# Patient Record
Sex: FEMALE | Race: WHITE | Employment: FULL TIME | URBAN - METROPOLITAN AREA
[De-identification: names, ages, dates, MRNs, and addresses within clinical notes are randomized per-mention and may not be internally consistent; named-entity substitution may affect disease eponyms.]

---

## 2017-11-20 ENCOUNTER — OFFICE VISIT (OUTPATIENT)
Dept: NEUROLOGY | Age: 42
End: 2017-11-20

## 2017-11-20 VITALS
RESPIRATION RATE: 18 BRPM | OXYGEN SATURATION: 99 % | BODY MASS INDEX: 22.91 KG/M2 | SYSTOLIC BLOOD PRESSURE: 102 MMHG | WEIGHT: 146 LBS | HEIGHT: 67 IN | HEART RATE: 72 BPM | DIASTOLIC BLOOD PRESSURE: 60 MMHG

## 2017-11-20 DIAGNOSIS — M54.2 CERVICALGIA: ICD-10-CM

## 2017-11-20 DIAGNOSIS — G44.219 EPISODIC TENSION-TYPE HEADACHE, NOT INTRACTABLE: Primary | ICD-10-CM

## 2017-11-20 RX ORDER — CLINDAMYCIN PHOSPHATE AND TRETINOIN 10; .25 MG/G; MG/G
GEL TOPICAL
COMMUNITY

## 2017-11-20 RX ORDER — FLUTICASONE PROPIONATE 50 MCG
2 SPRAY, SUSPENSION (ML) NASAL DAILY
COMMUNITY

## 2017-11-20 RX ORDER — MONTELUKAST SODIUM 10 MG/1
10 TABLET ORAL DAILY
COMMUNITY
End: 2018-03-12

## 2017-11-20 NOTE — PROGRESS NOTES
Neurology Consult Note      HISTORY PROVIDED BY: patient    Chief Complaint:   Chief Complaint   Patient presents with    Headache      Subjective:    Beto Morris is a 43 y.o. right handed female who presents in consultation for headaches. Pt has two charts, the other chart is [facility], last notes in that chart are from delivery in . Pt reports HAs for the last few years, she has been attributing these to sinus disease and allergies, but now having HAs when she doesn't have nasal congestion. She was seen by the NP at the [place] station, Hermelindo Brooks who bhavesh labs. Pain typically starts in back, but then feels like a tight band around her head, if she bends over or valsalvas it gets worse and throbs. No N/V, +Photophobia, no phonophobia, no vision changes. May have been having them once a month, lasting 2-3 days. She recently had a HA x 10 days, not responding to Advil, took Excedrin with relief for the rest of the day. It hurt to have towel around her head. States that this was a particularly bad month, only 10 HA free days, often just dull mild HA that she doesn't take medications for those. Mom with h/o HAs in association with TA at 70yo. Drinks 8 glasses of water a day she believes. Drinks 2 cups of caffeine a day. Sleeps well, but often self-sleep deprived. Bed midnight or later, up at 7:30AM.      Notes from Denia Elizalde, INÉS received after visit and reviewed - OV 17 - Initially seen 17 for HAs. Pt requested to see a neurologist.  Labs CBC with diff - wnl, CMP - wnl, TSH 1.86, ESR 9.       Past Medical History:   Diagnosis Date    Acne     Environmental allergies       Past Surgical History:   Procedure Laterality Date    HX  SECTION      ,       Social History     Social History    Marital status:      Spouse name: N/A    Number of children: N/A    Years of education: N/A     Occupational History    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[employer] History Main Topics    Smoking status: Never Smoker    Smokeless tobacco: Never Used    Alcohol use 1.2 - 1.8 oz/week     2 - 3 Glasses of wine per week    Drug use: No    Sexual activity: Not on file     Other Topics Concern    Not on file     Social History Narrative    Lives in [place] [facility] with  and 2 sons, 4yo and 7yo     Family History   Problem Relation Age of Onset    Headache Mother     Heart Disease Maternal Grandmother     Cancer Maternal Grandfather      bladder    Dementia Paternal Grandmother          Objective:   Review of Systems   Constitutional: Positive for malaise/fatigue. HENT: Negative. Eyes: Negative. Respiratory: Positive for cough. Cardiovascular: Negative. Gastrointestinal: Positive for constipation. Genitourinary: Negative. Musculoskeletal: Positive for myalgias. Skin: Negative. Neurological: Positive for headaches. Endo/Heme/Allergies: Negative. Psychiatric/Behavioral: The patient is nervous/anxious. No Known Allergies     Meds:    Current Outpatient Prescriptions:     montelukast (SINGULAIR) 10 mg tablet, Take 10 mg by mouth daily. , Disp: , Rfl:     fluticasone (FLONASE) 50 mcg/actuation nasal spray, 2 Sprays by Both Nostrils route daily. , Disp: , Rfl:     clindamycin-tretinoin (Mary Ax) 1.2-0.025 % topical gel, Apply  to affected area nightly. apply pea-sized amount TOPICALLY to entire face at bedtime; dot onto chin, cheeks, nose, and forehead then gently rub over entire face., Disp: , Rfl:     LEVONORGESTREL (MIRENA IU), by IntraUTERine route., Disp: , Rfl:     Imaging:  MRI Results (most recent):  No results found for this or any previous visit. CT Results (most recent):  No results found for this or any previous visit. Reviewed records in [employer] and Appwiz tab today    Lab Review   No results found for this or any previous visit.      Exam:  Visit Vitals    /60    Pulse 72    Resp 18    Ht 5' 7\" (1.702 m)    Wt 66.2 kg (146 lb)    SpO2 99%    BMI 22.87 kg/m2     General:  Alert, cooperative, no distress. Head:  Normocephalic, without obvious abnormality, atraumatic. Respiratory:  Heart:   Non labored breathing  Regular rate and rhythm, no murmurs   Neck:   2+ carotids, no bruits   Extremities: Warm, no cyanosis or edema. Pulses: 2+ radial pulses. Neurologic:  MS: Alert and oriented x 4, speech intact. Language intact. Attention and fund of knowledge appropriate. Recent and remote memory intact. Cranial Nerves:  II: visual fields Full to confrontation   II: pupils Equal, round, reactive to light   II: optic disc No papilledema   III,VII: ptosis none   III,IV,VI: extraocular muscles  EOMI, no nystagmus or diplopia   V: facial light touch sensation  normal   VII: facial muscle function   symmetric   VIII: hearing intact   IX: soft palate elevation  normal   XI: trapezius strength  5/5   XI: sternocleidomastoid strength 5/5   XII: tongue  Midline     Motor: normal bulk and tone, no tremor              Strength: 5/5 throughout, no PD  Sensory: intact to LT, PP  Coordination: FTN and HTS intact, LUDA intact  Gait: normal gait, able to tandem walk  Reflexes: 2+ symmetric, toes downgoing           Assessment/Plan   Pt is a 43 y.o. right handed female with onset of HAs in the last few years, with pain typically starting in back of head/neck, but then feels like a tight band around her head, +Photophobia, were only once a month, now HA free for only 10 days in last month. Exam is non-focal and unremarkable. Possible migraine headaches, but more suggestive of tension headaches triggered by neck pain and muscle tension. 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 Self-imposed sleep deprivation may also be playing a role. Pt is not interested in trial of a HA prevention med at this time.   Recommend PT for neck pain triggering HAs and suggested she looking into have an ergonomic evaluation of her work space to improve positioning to prevent neck pain/muscle tension. Pt is asked to keep a HA calendar and f/u in clinic in 3 months, instructed to call in the interim if needed. ICD-10-CM ICD-9-CM    1. Episodic tension-type headache, not intractable G44.219 339.11 REFERRAL TO PHYSICAL THERAPY   2. Cervicalgia M54.2 723.1 REFERRAL TO PHYSICAL THERAPY       Signed:   Melvin Briscoe MD  11/20/2017

## 2017-11-20 NOTE — PATIENT INSTRUCTIONS
10 [facility]   Statement to Patients  April 1, 2014      In an effort to ensure the large volume of patient prescription refills is processed in the most efficient and expeditious manner, we are asking our patients to assist us by calling your Pharmacy for all prescription refills, this will include also your  Mail Order Pharmacy. The pharmacy will contact our office electronically to continue the refill process. Please do not wait until the last minute to call your pharmacy. We need at least 48 hours (2days) to fill prescriptions. We also encourage you to call your pharmacy before going to  your prescription to make sure it is ready. With regard to controlled substance prescription refill requests (narcotic refills) that need to be picked up at our office, we ask your cooperation by providing us with at least 72 hours (3days) notice that you will need a refill. We will not refill narcotic prescription refill requests after 4:00pm on any weekday, Monday through Thursday, or after 2:00pm on Fridays, or on the weekends. We encourage everyone to explore another way of getting your prescription refill request processed using Ringio, our patient web portal through our electronic medical record system. Ringio is an efficient and effective way to communicate your medication request directly to the office and  downloadable as an fox on your smart phone . Ringio also features a review functionality that allows you to view your medication list as well as leave messages for your physician. Are you ready to get connected? If so please review the attatched instructions or speak to any of our staff to get you set up right away! Thank you so much for your cooperation. Should you have any questions please contact our Practice Administrator.     The Physicians and Staff,  [facility]           Please bring all medication bottles, including vitamins, supplements and any over-the-counter medications, to your next office visit.

## 2017-11-20 NOTE — MR AVS SNAPSHOT
Visit Information Date & Time Provider Department Dept. Phone Encounter #  
 11/20/2017 11:00 AM Darling Soto MD [facility] at [address] [id number] Follow-up Instructions Return in about 3 months (around 2/20/2018). Upcoming Health Maintenance Date Due DTaP/Tdap/Td series (1 - Tdap) 10/10/1996 PAP AKA CERVICAL CYTOLOGY 10/10/1996 Influenza Age 5 to Adult 8/1/2017 Allergies as of 11/20/2017  Review Complete On: 11/20/2017 By: Darling Soto MD  
 No Known Allergies Current Immunizations  Never Reviewed No immunizations on file. Not reviewed this visit You Were Diagnosed With   
  
 Codes Comments Episodic tension-type headache, not intractable    -  Primary ICD-10-CM: V85.361 ICD-9-CM: 339.11 Cervicalgia     ICD-10-CM: M54.2 ICD-9-CM: 723.1 Vitals BP Pulse Resp Height(growth percentile) Weight(growth percentile) SpO2  
 102/60 72 18 5' 7\" (1.702 m) 146 lb (66.2 kg) 99% BMI Smoking Status 22.87 kg/m2 Never Smoker Vitals History BMI and BSA Data Body Mass Index Body Surface Area  
 22.87 kg/m 2 1.77 m 2 Preferred Pharmacy Pharmacy Name Phone [id number] [address] [phone] Your Updated Medication List  
  
   
This list is accurate as of: 11/20/17 11:40 AM.  Always use your most recent med list.  
  
  
  
  
 FLONASE 50 mcg/actuation nasal spray Generic drug:  fluticasone 2 Sprays by Both Nostrils route daily. MIRENA IU  
by IntraUTERine route. SINGULAIR 10 mg tablet Generic drug:  montelukast  
Take 10 mg by mouth daily. ZIANA 1.2-0.025 % topical gel Generic drug:  clindamycin-tretinoin Apply  to affected area nightly. apply pea-sized amount TOPICALLY to entire face at bedtime; dot onto chin, cheeks, nose, and forehead then gently rub over entire face. We Performed the Following REFERRAL TO PHYSICAL THERAPY [DAO58 Custom] Comments:  
 Please eval and treat for muscle tension, neck pain, triggering headaches. Follow-up Instructions Return in about 3 months (around 2/20/2018). Referral Information Referral ID Referred By Referred To  
  
 [id number] Petra TORIBIO Not Available Visits Status Start Date End Date 1 New Request 11/20/17 11/20/18 If your referral has a status of pending review or denied, additional information will be sent to support the outcome of this decision. Patient Instructions PRESCRIPTION REFILL POLICY [facility] Statement to Patients April 1, 2014 In an effort to ensure the large volume of patient prescription refills is processed in the most efficient and expeditious manner, we are asking our patients to assist us by calling your Pharmacy for all prescription refills, this will include also your  Mail Order Pharmacy. The pharmacy will contact our office electronically to continue the refill process. Please do not wait until the last minute to call your pharmacy. We need at least 48 hours (2days) to fill prescriptions. We also encourage you to call your pharmacy before going to  your prescription to make sure it is ready. With regard to controlled substance prescription refill requests (narcotic refills) that need to be picked up at our office, we ask your cooperation by providing us with at least 72 hours (3days) notice that you will need a refill. We will not refill narcotic prescription refill requests after 4:00pm on any weekday, Monday through Thursday, or after 2:00pm on Fridays, or on the weekends. We encourage everyone to explore another way of getting your prescription refill request processed using [employer], our patient web portal through our electronic medical record system.  [employer] is an efficient and effective way to communicate your medication request directly to the office and  downloadable as an fox on your smart phone . Skypaz also features a review functionality that allows you to view your medication list as well as leave messages for your physician. Are you ready to get connected? If so please review the attatched instructions or speak to any of our staff to get you set up right away! Thank you so much for your cooperation. Should you have any questions please contact our Practice Administrator. The Physicians and Staff,  [facility] Please bring all medication bottles, including vitamins, supplements and any over-the-counter medications, to your next office visit. Introducing [address]! [facility] introduces Events Coret patient portal. Now you can access parts of your medical record, email your doctor's office, and request medication refills online. 1. In your internet browser, go to [email or web address] 2. Click on the First Time User? Click Here link in the Sign In box. You will see the New Member Sign Up page. 3. Enter your Skypaz Access Code exactly as it appears below. You will not need to use this code after youve completed the sign-up process. If you do not sign up before the expiration date, you must request a new code. · Skypaz Access Code: [email or web address] Expires: 2/18/2018 10:35 AM 
 
4. Enter the last four digits of your Social Security Number (xxxx) and Date of Birth (mm/dd/yyyy) as indicated and click Submit. You will be taken to the next sign-up page. 5. Create a Events Coret ID. This will be your Skypaz login ID and cannot be changed, so think of one that is secure and easy to remember. 6. Create a Skypaz password. You can change your password at any time. 7. Enter your Password Reset Question and Answer. This can be used at a later time if you forget your password. 8. Enter your e-mail address. You will receive e-mail notification when new information is available in [address] 9. Click Sign Up. You can now view and download portions of your medical record. 10. Click the Download Summary menu link to download a portable copy of your medical information. If you have questions, please visit the Frequently Asked Questions section of the [employer] website. Remember, [employer] is NOT to be used for urgent needs. For medical emergencies, dial 911. Now available from your iPhone and Android! Please provide this summary of care documentation to your next provider. Your primary care clinician is listed as Bailee Champion. If you have any questions after today's visit, please call [phone].

## 2017-11-20 NOTE — PROGRESS NOTES
Patient here for evaluation of headaches. She also complained of neck pain starting in the morning before the headache starts.

## 2017-11-22 ENCOUNTER — DOCUMENTATION ONLY (OUTPATIENT)
Dept: NEUROLOGY | Age: 42
End: 2017-11-22

## 2018-02-22 ENCOUNTER — OFFICE VISIT (OUTPATIENT)
Dept: NEUROLOGY | Age: 43
End: 2018-02-22

## 2018-02-22 VITALS
HEIGHT: 67 IN | RESPIRATION RATE: 18 BRPM | SYSTOLIC BLOOD PRESSURE: 122 MMHG | DIASTOLIC BLOOD PRESSURE: 62 MMHG | OXYGEN SATURATION: 98 % | WEIGHT: 140 LBS | BODY MASS INDEX: 21.97 KG/M2 | HEART RATE: 84 BPM

## 2018-02-22 DIAGNOSIS — M54.2 CERVICALGIA: ICD-10-CM

## 2018-02-22 DIAGNOSIS — G43.009 MIGRAINE WITHOUT AURA AND WITHOUT STATUS MIGRAINOSUS, NOT INTRACTABLE: Primary | ICD-10-CM

## 2018-02-22 RX ORDER — SUMATRIPTAN 50 MG/1
TABLET, FILM COATED ORAL
Qty: 9 TAB | Refills: 3 | Status: SHIPPED | OUTPATIENT
Start: 2018-02-22 | End: 2019-08-14

## 2018-02-22 NOTE — PROGRESS NOTES
Patient here for follow up on headaches. She reported she had an episode about 2 weeks ago with intense head pain but resolved ibuprofen and a sinus relief medication.

## 2018-02-22 NOTE — PATIENT INSTRUCTIONS
10 [facility]   Statement to Patients  April 1, 2014      In an effort to ensure the large volume of patient prescription refills is processed in the most efficient and expeditious manner, we are asking our patients to assist us by calling your Pharmacy for all prescription refills, this will include also your  Mail Order Pharmacy. The pharmacy will contact our office electronically to continue the refill process. Please do not wait until the last minute to call your pharmacy. We need at least 48 hours (2days) to fill prescriptions. We also encourage you to call your pharmacy before going to  your prescription to make sure it is ready. With regard to controlled substance prescription refill requests (narcotic refills) that need to be picked up at our office, we ask your cooperation by providing us with at least 72 hours (3days) notice that you will need a refill. We will not refill narcotic prescription refill requests after 4:00pm on any weekday, Monday through Thursday, or after 2:00pm on Fridays, or on the weekends. We encourage everyone to explore another way of getting your prescription refill request processed using "[employer] our patient web portal through our electronic medical record system. "[employer] is an efficient and effective way to communicate your medication request directly to the office and  downloadable as an fox on your smart phone . "[employer] also features a review functionality that allows you to view your medication list as well as leave messages for your physician. Are you ready to get connected? If so please review the attatched instructions or speak to any of our staff to get you set up right away! Thank you so much for your cooperation. Should you have any questions please contact our Practice Administrator.     The Physicians and Staff,  [facility]           Please bring all medication bottles, including vitamins, supplements and any over-the-counter medications, to your next office visit.

## 2018-02-22 NOTE — MR AVS SNAPSHOT
[facility] [address] [place] [address] 
[phone] Patient: Murtaza Beach MRN: [id number] :1975 Visit Information Date & Time Provider Department Dept. Phone Encounter #  
 2018  9:00 AM Denver Liner, MD [facility] at [address] [place] [address] Road [id number] Follow-up Instructions Return in about 3 months (around 2018). Upcoming Health Maintenance Date Due DTaP/Tdap/Td series (1 - Tdap) 10/10/1996 PAP AKA CERVICAL CYTOLOGY 10/10/1996 Influenza Age 5 to Adult 2017 Allergies as of 2018  Review Complete On: 2018 By: Denver Liner, MD  
 No Known Allergies Current Immunizations  Never Reviewed No immunizations on file. Not reviewed this visit You Were Diagnosed With   
  
 Codes Comments Migraine without aura and without status migrainosus, not intractable    -  Primary ICD-10-CM: G43.009 ICD-9-CM: 346.10 Cervicalgia     ICD-10-CM: M54.2 ICD-9-CM: 723.1 Vitals BP Pulse Resp Height(growth percentile) Weight(growth percentile) SpO2  
 122/62 84 18 5' 7\" (1.702 m) 140 lb (63.5 kg) 98% BMI OB Status Smoking Status 21.93 kg/m2 Needs review Never Smoker Vitals History BMI and BSA Data Body Mass Index Body Surface Area  
 21.93 kg/m 2 1.73 m 2 Preferred Pharmacy Pharmacy Name Phone [id number] [place] [phone] Your Updated Medication List  
  
   
This list is accurate as of 18  9:50 AM.  Always use your most recent med list.  
  
  
  
  
 FLONASE 50 mcg/actuation nasal spray Generic drug:  fluticasone 2 Sprays by Both Nostrils route daily. ibuprofen 600 mg tablet Commonly known as:  MOTRIN Take 1 Tab by mouth every eight (8) hours as needed for Pain. MIRENA IU  
by IntraUTERine route. oxyCODONE-acetaminophen 5-325 mg per tablet Commonly known as:  PERCOCET Take 1 Tab by mouth every four (4) hours as needed for Pain.  
  
 prenatal multivit-ca-min-fe-fa Tab Take  by mouth. SINGULAIR 10 mg tablet Generic drug:  montelukast  
Take 10 mg by mouth daily. SUMAtriptan 50 mg tablet Commonly known as:  IMITREX Take 1 tab by mouth at onset of headache, may repeat x 1 after 2 hours if needed. Limit 2 in 24 hours ZIANA 1.2-0.025 % topical gel Generic drug:  clindamycin-tretinoin Apply  to affected area nightly. apply pea-sized amount TOPICALLY to entire face at bedtime; dot onto chin, cheeks, nose, and forehead then gently rub over entire face. Prescriptions Sent to Pharmacy Refills SUMAtriptan (IMITREX) 50 mg tablet 3 Sig: Take 1 tab by mouth at onset of headache, may repeat x 1 after 2 hours if needed. Limit 2 in 24 hours Class: Normal  
 Pharmacy: [place], [address] [place] [address].  #: [phone] Follow-up Instructions Return in about 3 months (around 6/1/2018). Patient Instructions PRESCRIPTION REFILL POLICY [facility] Statement to Patients April 1, 2014 In an effort to ensure the large volume of patient prescription refills is processed in the most efficient and expeditious manner, we are asking our patients to assist us by calling your Pharmacy for all prescription refills, this will include also your  Mail Order Pharmacy. The pharmacy will contact our office electronically to continue the refill process. Please do not wait until the last minute to call your pharmacy. We need at least 48 hours (2days) to fill prescriptions. We also encourage you to call your pharmacy before going to  your prescription to make sure it is ready.   
 
With regard to controlled substance prescription refill requests (narcotic refills) that need to be picked up at our office, we ask your cooperation by providing us with at least 72 hours (3days) notice that you will need a refill. We will not refill narcotic prescription refill requests after 4:00pm on any weekday, Monday through Thursday, or after 2:00pm on Fridays, or on the weekends. We encourage everyone to explore another way of getting your prescription refill request processed using [employer], our patient web portal through our electronic medical record system. [employer] is an efficient and effective way to communicate your medication request directly to the office and  downloadable as an fox on your smart phone . [employer] also features a review functionality that allows you to view your medication list as well as leave messages for your physician. Are you ready to get connected? If so please review the attatched instructions or speak to any of our staff to get you set up right away! Thank you so much for your cooperation. Should you have any questions please contact our Practice Administrator. The Physicians and Staff,  [facility] Please bring all medication bottles, including vitamins, supplements and any over-the-counter medications, to your next office visit. Introducing [facility]! [facility] introduces [employer] patient portal. Now you can access parts of your medical record, email your doctor's office, and request medication refills online. 1. In your internet browser, go to [email or web address] 2. Click on the First Time User? Click Here link in the Sign In box. You will see the New Member Sign Up page. 3. Enter your AN[employer] Access Code exactly as it appears below. You will not need to use this code after youve completed the sign-up process. If you do not sign up before the expiration date, you must request a new code. · [employer] Access Code: [email or web address] Expires: 5/23/2018  9:05 AM 
 
 4. Enter the last four digits of your Social Security Number (xxxx) and Date of Birth (mm/dd/yyyy) as indicated and click Submit. You will be taken to the next sign-up page. 5. Create a Celebration Creation ID. This will be your Celebr[employer] login ID and cannot be changed, so think of one that is secure and easy to remember. 6. Create a [employer] Creation password. You can change your password at any time. 7. Enter your Password Reset Question and Answer. This can be used at a later time if you forget your password. 8. Enter your e-mail address. You will receive e-mail notification when new information is available in [address] 9. Click Sign Up. You can now view and download portions of your medical record. 10. Click the Download Summary menu link to download a portable copy of your medical information. If you have questions, please visit the Frequently Asked Questions section of the [employer] website. Remember, [employer] is NOT to be used for urgent needs. For medical emergencies, dial 911. Now available from your iPhone and Android! Please provide this summary of care documentation to your next provider. Your primary care clinician is listed as Gina Kamara. If you have any questions after today's visit, please call [phone].

## 2018-02-22 NOTE — PROGRESS NOTES
Neurology Consult Note      HISTORY PROVIDED BY: patient    Chief Complaint:   Chief Complaint   Patient presents with    Headache     f/u      Subjective:   Pt is a 43 y.o. right handed female initially and last seen in clinic on 11/20/17 with onset of HAs in the last few years, with pain typically starting in back of head/neck, but then feels like a tight band around her head, +Photophobia, were only once a month, at last visit she was only HA free for only 10 days in previous month. Exam was non-focal and unremarkable. Possible migraine headaches, but more suggestive of tension headaches triggered by neck pain and muscle tension. 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 Self-imposed sleep deprivation may also be playing a role. Pt was not interested in trial of a HA prevention med. Recommended PT for neck pain triggering HAs and suggested she looking into have an ergonomic evaluation of her work space to improve positioning to prevent neck pain/muscle tension. Pt was asked to keep a HA calendar and f/u in clinic in 3 months. She returns for f/u. HAs were improved. Had PT for 6-8 weeks, worked on posture and positioning at work. Did strengthening exercises. Able to wear a pony tail again without getting a HA. Has tried to increase her CV activity to help reduce stress. Then two weeks ago had another bad bout of HA, severe for 3-4 days +Photophobia, left eye was teary, lasted for 10 days. No obvious congestion in association with this at first, but after 3-4 days she had nasal congestion. She took ibuprofen 400mg with ongoing HA, but manageable. She also took a sinus medication with tylenol, helped HA, but no change in sinus disease. She does have baseline allergies. These occurred after up for 10-15 minutes. She is going to ENT for evaluation of sinus disease. Notes from [facility] [address] 11/9/17 reviewed - Using Excedrin MHA for abortive therapy.  Labs 11/10/17 - CBC with diff - wnl, CMP - wnl, TSH 1.86. Requested to see a neurologist.     Past Medical History:   Diagnosis Date    Abnormal Pap smear     Tx. with medication 4 yr ago, negative now    Acne     Anemia NEC     mild pregnancy related    Environmental allergies     HX OTHER MEDICAL     Partial Previa with current Pregnancy Winter 2011    HX OTHER MEDICAL     treated for 2 weeks with Diflucan 100mg qd,mastitis, abx for 5 days starting on 3/16/2011    Lesion of nipple     nipples abraded bilaterally    Rh negative status during pregnancy 2013    Unspecified breast disorder     mastitis, 3/16/2011      Past Surgical History:   Procedure Laterality Date    HX  SECTION      HX  SECTION      ,     HX OTHER SURGICAL      wisdom teeth removed    HX OTHER SURGICAL      D&C  02/10    HX WISDOM TEETH EXTRACTION        Social History     Social History    Marital status:      Spouse name: N/A    Number of children: N/A    Years of education: N/A     Occupational History    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[employer]      Social History Main Topics    Smoking status: Never Smoker    Smokeless tobacco: Never Used    Alcohol use 1.2 - 1.8 oz/week     2 - 3 Glasses of wine per week    Drug use: No    Sexual activity: Yes     Partners: Male     Other Topics Concern    Not on file     Social History Narrative    ** Merged History Encounter **         Lives in [place] with  and 2 sons, 4yo and 7yo     Family History   Problem Relation Age of Onset    Headache Mother     Heart Disease Maternal Grandmother     Cancer Maternal Grandfather      bladder/Bladder CA    Dementia Paternal Grandmother     Hypertension Maternal Grandfather          Objective:   Review of Systems : Per HPI    No Known Allergies     Meds:    Current Outpatient Prescriptions:     fluticasone (FLONASE) 50 mcg/actuation nasal spray, 2 Sprays by Both Nostrils route daily. , Disp: , Rfl:    clindamycin-tretinoin (Casper Santos) 1.2-0.025 % topical gel, Apply  to affected area nightly. apply pea-sized amount TOPICALLY to entire face at bedtime; dot onto chin, cheeks, nose, and forehead then gently rub over entire face., Disp: , Rfl:     LEVONORGESTREL (MIRENA IU), by IntraUTERine route., Disp: , Rfl:     montelukast (SINGULAIR) 10 mg tablet, Take 10 mg by mouth daily. , Disp: , Rfl:     oxyCODONE-acetaminophen (PERCOCET) 5-325 mg per tablet, Take 1 Tab by mouth every four (4) hours as needed for Pain., Disp: 28 Tab, Rfl: 0    ibuprofen (MOTRIN) 600 mg tablet, Take 1 Tab by mouth every eight (8) hours as needed for Pain., Disp: 36 Tab, Rfl: 2    prenatal multivit-ca-min-fe-fa Tab, Take  by mouth., Disp: , Rfl:     Imaging:  MRI Results (most recent):  No results found for this or any previous visit. CT Results (most recent):  No results found for this or any previous visit. Reviewed records in [employer] and [employer] tab today    Lab Review   Results for orders placed or performed during the hospital encounter of 06/27/13   GYN RAPID GP B STREP   Result Value Ref Range    GrBStrep, External negative    CBC WITH AUTOMATED DIFF   Result Value Ref Range    WBC 8.0 3.6 - 11.0 K/uL    RBC 3.78 (L) 3.80 - 5.20 M/uL    HGB 12.4 11.5 - 16.0 g/dL    HCT 36.1 35.0 - 47.0 %    MCV 95.5 80.0 - 99.0 FL    MCH 32.8 26.0 - 34.0 PG    MCHC 34.3 30.0 - 36.5 g/dL    RDW 13.0 11.5 - 14.5 %    PLATELET 975 (L) 177 - 400 K/uL    NEUTROPHILS 68 32 - 75 %    LYMPHOCYTES 22 12 - 49 %    MONOCYTES 10 5 - 13 %    EOSINOPHILS 0 0 - 7 %    BASOPHILS 0 0 - 1 %    ABS. NEUTROPHILS 5.4 1.8 - 8.0 K/UL    ABS. LYMPHOCYTES 1.8 0.8 - 3.5 K/UL    ABS. MONOCYTES 0.8 0.0 - 1.0 K/UL    ABS. EOSINOPHILS 0.0 0.0 - 0.4 K/UL    ABS.  BASOPHILS 0.0 0.0 - 0.1 K/UL   CBC WITH AUTOMATED DIFF   Result Value Ref Range    WBC 8.9 3.6 - 11.0 K/uL    RBC 3.22 (L) 3.80 - 5.20 M/uL    HGB 10.6 (L) 11.5 - 16.0 g/dL    HCT 31.1 (L) 35.0 - 47.0 %    MCV 96.6 80.0 - 99.0 FL    MCH 32.9 26.0 - 34.0 PG    MCHC 34.1 30.0 - 36.5 g/dL    RDW 13.1 11.5 - 14.5 %    PLATELET 020 (L) 270 - 400 K/uL    NEUTROPHILS 76 (H) 32 - 75 %    LYMPHOCYTES 16 12 - 49 %    MONOCYTES 7 5 - 13 %    EOSINOPHILS 1 0 - 7 %    BASOPHILS 0 0 - 1 %    ABS. NEUTROPHILS 6.8 1.8 - 8.0 K/UL    ABS. LYMPHOCYTES 1.4 0.8 - 3.5 K/UL    ABS. MONOCYTES 0.7 0.0 - 1.0 K/UL    ABS. EOSINOPHILS 0.0 0.0 - 0.4 K/UL    ABS. BASOPHILS 0.0 0.0 - 0.1 K/UL   TYPE & SCREEN   Result Value Ref Range    Crossmatch Expiration 06/30/2013     ABO/Rh(D) O NEG     Antibody screen NEG         Exam:  Visit Vitals    /62    Pulse 84    Resp 18    Ht 5' 7\" (1.702 m)    Wt 63.5 kg (140 lb)    SpO2 98%    BMI 21.93 kg/m2     General:  Alert, cooperative, no distress. Head:  Normocephalic, without obvious abnormality, atraumatic. Respiratory:  Heart:   Non labored breathing  Regular rate and rhythm, no murmurs   Neck:      Extremities: Warm, no cyanosis or edema. Pulses: 2+ radial pulses. Neurologic:  MS: Alert and oriented x 4, speech intact. Language intact. Attention and fund of knowledge appropriate. Recent and remote memory intact.   Cranial Nerves:  II: visual fields    II: pupils    II: optic disc    III,VII: ptosis none   III,IV,VI: extraocular muscles  EOMI, no nystagmus or diplopia   V: facial light touch sensation     VII: facial muscle function   symmetric   VIII: hearing intact   IX: soft palate elevation     XI: trapezius strength     XI: sternocleidomastoid strength    XII: tongue       Motor: normal bulk and tone, no tremor              Strength: 5/5 throughout, no PD  Sensory:  Coordination: FTN intact  Gait: normal gait  Reflexes: 2+ symmetric           Assessment/Plan   Pt is a 43 y.o. right handed female presenting in Nov, 2017 with c/o onset of HAs in the last few years, with pain typically starting in back of head/neck, but then feels like a tight band around her head, +Photophobia, increasing in frequency. HAs improved with PT. Still with occasional HA lasting up to 4 days. Exam is non-focal and unremarkable. Possible migraine headaches and tension headaches triggered by neck pain and muscle tension. Start trial of Imitrex 50mg tabs at onset of HA for abortive therapy, side effects reviewed. F/u in clinic in 3 months to reassess. ICD-10-CM ICD-9-CM    1. Migraine without aura and without status migrainosus, not intractable G43.009 346.10    2. Cervicalgia M54.2 723.1        Signed:   Denver Liner, MD  2/22/2018

## 2018-06-04 ENCOUNTER — OFFICE VISIT (OUTPATIENT)
Dept: NEUROLOGY | Age: 43
End: 2018-06-04

## 2018-06-04 VITALS
HEART RATE: 64 BPM | SYSTOLIC BLOOD PRESSURE: 102 MMHG | RESPIRATION RATE: 16 BRPM | DIASTOLIC BLOOD PRESSURE: 64 MMHG | HEIGHT: 67 IN | BODY MASS INDEX: 22.85 KG/M2 | WEIGHT: 145.6 LBS | OXYGEN SATURATION: 98 %

## 2018-06-04 DIAGNOSIS — G44.219 EPISODIC TENSION-TYPE HEADACHE, NOT INTRACTABLE: ICD-10-CM

## 2018-06-04 DIAGNOSIS — M54.2 CERVICALGIA: ICD-10-CM

## 2018-06-04 DIAGNOSIS — G43.009 MIGRAINE WITHOUT AURA AND WITHOUT STATUS MIGRAINOSUS, NOT INTRACTABLE: Primary | ICD-10-CM

## 2018-06-04 NOTE — PROGRESS NOTES
Neurology Consult Note      HISTORY PROVIDED BY: patient    Chief Complaint:   Chief Complaint   Patient presents with    Migraine      Subjective:   Pt is a 43 y.o. right handed female last seen in clinic on 2/22/18 in f/u for HAs. Pt initially presented in Nov, 2017 with c/o onset of HAs in the last few years, with pain typically starting in back of head/neck, but then feels like a tight band around her head, +Photophobia, increasing in frequency. HAs improved with PT. Still with occasional HA lasting up to 4 days. Exam was non-focal and unremarkable. Possible migraine headaches and tension headaches triggered by neck pain and muscle tension. Started trial of Imitrex 50mg tabs at onset of HA for abortive therapy. She returns for f/u. She has not had a severe HA since last visit. She did have a HA for four days last week, she attributes this to sinus issues with drainage and tooth pain. She has not tried Imitrex yet. She has thought several times that she might be getting a HA, had mild pain. Did not take any pain meds for these, was afraid to take ibuprofen due to fear of rebound HA. She went to ENT and had sinus CT that she was told was unremarkable, and allergy testing revealing multiple allergens, she was put on daily sinus washes, increased Flonase to twice a day, and is on montelukast.  She is allergic to so many things they are hesitant to do allergy shots. We discussed rebound HAs and pt's questions regarding abortive meds and use. Mentions spot of numbness/itching on right mid back over scapula.      Past Medical History:   Diagnosis Date    Abnormal Pap smear     Tx. with medication 4 yr ago, negative now    Acne     Anemia NEC     mild pregnancy related    Environmental allergies     HX OTHER MEDICAL     Partial Previa with current Pregnancy Winter 2011    HX OTHER MEDICAL     treated for 2 weeks with Diflucan 100mg qd,mastitis, abx for 5 days starting on 3/16/2011    Lesion of nipple nipples abraded bilaterally    Rh negative status during pregnancy 2013    Unspecified breast disorder     mastitis, 3/16/2011      Past Surgical History:   Procedure Laterality Date    HX  SECTION      HX  SECTION      ,     HX OTHER SURGICAL      wisdom teeth removed    HX OTHER SURGICAL      D&C  02/10    HX WISDOM TEETH EXTRACTION        Social History     Social History    Marital status:      Spouse name: N/A    Number of children: N/A    Years of education: N/A     Occupational History    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[employer]      Social History Main Topics    Smoking status: Never Smoker    Smokeless tobacco: Never Used    Alcohol use 1.2 - 1.8 oz/week     2 - 3 Glasses of wine per week    Drug use: No    Sexual activity: Yes     Partners: Male     Other Topics Concern    Not on file     Social History Narrative    ** Merged History Encounter **         Lives in [place] with  and 2 sons, 4yo and 5yo     Family History   Problem Relation Age of Onset    Headache Mother     Heart Disease Maternal Grandmother     Cancer Maternal Grandfather      bladder/Bladder CA    Dementia Paternal Grandmother     Hypertension Maternal Grandfather          Objective:   Review of Systems : Per HPI    No Known Allergies     Meds:    Current Outpatient Prescriptions:     SUMAtriptan (IMITREX) 50 mg tablet, Take 1 tab by mouth at onset of headache, may repeat x 1 after 2 hours if needed. Limit 2 in 24 hours, Disp: 9 Tab, Rfl: 3    fluticasone (FLONASE) 50 mcg/actuation nasal spray, 2 Sprays by Both Nostrils route daily. , Disp: , Rfl:     clindamycin-tretinoin (Maximilian Jazmin) 1.2-0.025 % topical gel, Apply  to affected area nightly.  apply pea-sized amount TOPICALLY to entire face at bedtime; dot onto chin, cheeks, nose, and forehead then gently rub over entire face., Disp: , Rfl:     LEVONORGESTREL (MIRENA IU), by IntraUTERine route., Disp: , Rfl:     ibuprofen (MOTRIN) 600 mg tablet, Take 1 Tab by mouth every eight (8) hours as needed for Pain., Disp: 36 Tab, Rfl: 2    Imaging:  MRI Results (most recent):  No results found for this or any previous visit. CT Results (most recent):  No results found for this or any previous visit. Reviewed records in [employer] and [employer] tab today    Lab Review   Results for orders placed or performed during the hospital encounter of 06/27/13   GYN RAPID GP B STREP   Result Value Ref Range    GrBStrep, External negative    CBC WITH AUTOMATED DIFF   Result Value Ref Range    WBC 8.0 3.6 - 11.0 K/uL    RBC 3.78 (L) 3.80 - 5.20 M/uL    HGB 12.4 11.5 - 16.0 g/dL    HCT 36.1 35.0 - 47.0 %    MCV 95.5 80.0 - 99.0 FL    MCH 32.8 26.0 - 34.0 PG    MCHC 34.3 30.0 - 36.5 g/dL    RDW 13.0 11.5 - 14.5 %    PLATELET 932 (L) 339 - 400 K/uL    NEUTROPHILS 68 32 - 75 %    LYMPHOCYTES 22 12 - 49 %    MONOCYTES 10 5 - 13 %    EOSINOPHILS 0 0 - 7 %    BASOPHILS 0 0 - 1 %    ABS. NEUTROPHILS 5.4 1.8 - 8.0 K/UL    ABS. LYMPHOCYTES 1.8 0.8 - 3.5 K/UL    ABS. MONOCYTES 0.8 0.0 - 1.0 K/UL    ABS. EOSINOPHILS 0.0 0.0 - 0.4 K/UL    ABS. BASOPHILS 0.0 0.0 - 0.1 K/UL   CBC WITH AUTOMATED DIFF   Result Value Ref Range    WBC 8.9 3.6 - 11.0 K/uL    RBC 3.22 (L) 3.80 - 5.20 M/uL    HGB 10.6 (L) 11.5 - 16.0 g/dL    HCT 31.1 (L) 35.0 - 47.0 %    MCV 96.6 80.0 - 99.0 FL    MCH 32.9 26.0 - 34.0 PG    MCHC 34.1 30.0 - 36.5 g/dL    RDW 13.1 11.5 - 14.5 %    PLATELET 422 (L) 094 - 400 K/uL    NEUTROPHILS 76 (H) 32 - 75 %    LYMPHOCYTES 16 12 - 49 %    MONOCYTES 7 5 - 13 %    EOSINOPHILS 1 0 - 7 %    BASOPHILS 0 0 - 1 %    ABS. NEUTROPHILS 6.8 1.8 - 8.0 K/UL    ABS. LYMPHOCYTES 1.4 0.8 - 3.5 K/UL    ABS. MONOCYTES 0.7 0.0 - 1.0 K/UL    ABS. EOSINOPHILS 0.0 0.0 - 0.4 K/UL    ABS.  BASOPHILS 0.0 0.0 - 0.1 K/UL   TYPE & SCREEN   Result Value Ref Range    Crossmatch Expiration 06/30/2013     ABO/Rh(D) O NEG     Antibody screen NEG         Exam:  Visit Vitals    /64    Pulse 64    Resp 16    Ht 5' 7\" (1.702 m)    Wt 66 kg (145 lb 9.6 oz)    SpO2 98%    BMI 22.8 kg/m2     General:  Alert, cooperative, no distress. Head:  Normocephalic, without obvious abnormality, atraumatic. Respiratory:  Heart:   Non labored breathing  Regular rate and rhythm, no murmurs   Neck:      Extremities: Warm, no cyanosis or edema. Pulses: 2+ radial pulses. Neurologic:  MS: Alert and oriented x 4, speech intact. Language intact. Attention and fund of knowledge appropriate. Recent and remote memory intact. Cranial Nerves:  II: visual fields VFF   II: pupils    II: optic disc    III,VII: ptosis none   III,IV,VI: extraocular muscles  EOMI, no nystagmus or diplopia   V: facial light touch sensation     VII: facial muscle function   symmetric   VIII: hearing intact   IX: soft palate elevation     XI: trapezius strength     XI: sternocleidomastoid strength    XII: tongue       Motor: normal bulk and tone, no tremor              Strength: 5/5 throughout, no PD  Sensory:  Coordination: FTN intact  Gait: normal gait  Reflexes: 2+ symmetric           Assessment/Plan   Pt is a 43 y.o. right handed female who initially presented in Nov, 2017 with c/o onset of HAs in the last few years, with pain typically starting in back of head/neck, but then feels like a tight band around her head, +Photophobia, increasing in frequency. HAs improved with PT. Still with occasional HA lasting up to 4 days. Exam is non-focal and unremarkable. Possible migraine headaches and tension headaches triggered by neck pain and muscle tension, as well as allergies. Discussed use of abortive meds, may try ibuprofen or Imitrex 50mg tabs at onset of HA. Paresthesia over right mid back likely musculoskeletal in etiology due to muscle tension. Suggested she try myofacial release. F/u in clinic in six months, instructed to call in the interim if needed. ICD-10-CM ICD-9-CM    1.  Migraine without aura and without status migrainosus, not intractable G43.009 346.10    2. Episodic tension-type headache, not intractable G44.219 339.11    3. Cervicalgia M54.2 723.1        Signed:   Shira Wilson MD  6/4/2018

## 2018-06-04 NOTE — PATIENT INSTRUCTIONS

## 2018-06-04 NOTE — MR AVS SNAPSHOT
[facility] [address] [address] [place] [address] 
[phone] Patient: Natty Nolan MRN: [id number] :1975 Visit Information Date & Time Provider Department Dept. Phone Encounter #  
 2018  9:00 AM MD [facility] at [address] [id number] Follow-up Instructions Return in about 6 months (around 2018). Upcoming Health Maintenance Date Due DTaP/Tdap/Td series (1 - Tdap) 10/10/1996 PAP AKA CERVICAL CYTOLOGY 10/10/1996 Influenza Age 5 to Adult 2018 Allergies as of 2018  Review Complete On: 2018 By: Darren Pope LPN No Known Allergies Current Immunizations  Never Reviewed No immunizations on file. Not reviewed this visit You Were Diagnosed With   
  
 Codes Comments Migraine without aura and without status migrainosus, not intractable    -  Primary ICD-10-CM: G43.009 ICD-9-CM: 346.10 Episodic tension-type headache, not intractable     ICD-10-CM: Y13.687 ICD-9-CM: 339.11 Cervicalgia     ICD-10-CM: M54.2 ICD-9-CM: 723.1 Vitals BP Pulse Resp Height(growth percentile) Weight(growth percentile) SpO2  
 102/64 64 16 5' 7\" (1.702 m) 145 lb 9.6 oz (66 kg) 98% BMI OB Status Smoking Status 22.8 kg/m2 Needs review Never Smoker Vitals History BMI and BSA Data Body Mass Index Body Surface Area  
 22.8 kg/m 2 1.77 m 2 Preferred Pharmacy Pharmacy Name Phone [id number] [address] [phone] Your Updated Medication List  
  
   
This list is accurate as of 18  9:35 AM.  Always use your most recent med list.  
  
  
  
  
 FLONASE 50 mcg/actuation nasal spray Generic drug:  fluticasone 2 Sprays by Both Nostrils route daily. ibuprofen 600 mg tablet Commonly known as:  MOTRIN  
 Take 1 Tab by mouth every eight (8) hours as needed for Pain. MIRENA IU  
by IntraUTERine route. SUMAtriptan 50 mg tablet Commonly known as:  IMITREX Take 1 tab by mouth at onset of headache, may repeat x 1 after 2 hours if needed. Limit 2 in 24 hours ZIANA 1.2-0.025 % topical gel Generic drug:  clindamycin-tretinoin Apply  to affected area nightly. apply pea-sized amount TOPICALLY to entire face at bedtime; dot onto chin, cheeks, nose, and forehead then gently rub over entire face. Follow-up Instructions Return in about 6 months (around 12/4/2018). Patient Instructions A Healthy Lifestyle: Care Instructions Your Care Instructions A healthy lifestyle can help you feel good, stay at a healthy weight, and have plenty of energy for both work and play. A healthy lifestyle is something you can share with your whole family. A healthy lifestyle also can lower your risk for serious health problems, such as high blood pressure, heart disease, and diabetes. You can follow a few steps listed below to improve your health and the health of your family. Follow-up care is a key part of your treatment and safety. Be sure to make and go to all appointments, and call your doctor if you are having problems. It's also a good idea to know your test results and keep a list of the medicines you take. How can you care for yourself at home? · Do not eat too much sugar, fat, or fast foods. You can still have dessert and treats now and then. The goal is moderation. · Start small to improve your eating habits. Pay attention to portion sizes, drink less juice and soda pop, and eat more fruits and vegetables. ¨ Eat a healthy amount of food. A 3-ounce serving of meat, for example, is about the size of a deck of cards. Fill the rest of your plate with vegetables and whole grains. ¨ Limit the amount of soda and sports drinks you have every day.  Drink more water when you are thirsty. ¨ Eat at least 5 servings of fruits and vegetables every day. It may seem like a lot, but it is not hard to reach this goal. A serving or helping is 1 piece of fruit, 1 cup of vegetables, or 2 cups of leafy, raw vegetables. Have an apple or some carrot sticks as an afternoon snack instead of a candy bar. Try to have fruits and/or vegetables at every meal. 
· Make exercise part of your daily routine. You may want to start with simple activities, such as walking, bicycling, or slow swimming. Try to be active 30 to 60 minutes every day. You do not need to do all 30 to 60 minutes all at once. For example, you can exercise 3 times a day for 10 or 20 minutes. Moderate exercise is safe for most people, but it is always a good idea to talk to your doctor before starting an exercise program. 
· Keep moving. [place] the lawn, work in the garden, or [employer]. Take the stairs instead of the elevator at work. · If you smoke, quit. People who smoke have an increased risk for heart attack, stroke, cancer, and other lung illnesses. Quitting is hard, but there are ways to boost your chance of quitting tobacco for good. ¨ Use nicotine gum, patches, or lozenges. ¨ Ask your doctor about stop-smoking programs and medicines. ¨ Keep trying. In addition to reducing your risk of diseases in the future, you will notice some benefits soon after you stop using tobacco. If you have shortness of breath or asthma symptoms, they will likely get better within a few weeks after you quit. · Limit how much alcohol you drink. Moderate amounts of alcohol (up to 2 drinks a day for men, 1 drink a day for women) are okay. But drinking too much can lead to liver problems, high blood pressure, and other health problems. Family health If you have a family, there are many things you can do together to improve your health. · Eat meals together as a family as often as possible. · Eat healthy foods. This includes fruits, vegetables, lean meats and dairy, and whole grains. · Include your family in your fitness plan. Most people think of activities such as jogging or tennis as the way to fitness, but there are many ways you and your family can be more active. Anything that makes you breathe hard and gets your heart pumping is exercise. Here are some tips: 
¨ Walk to do errands or to take your child to school or the bus. ¨ Go for a family bike ride after dinner instead of watching TV. Where can you learn more? Go to http://isaelAPT Therapeuticsada.info/. Enter W233 in the search box to learn more about \"A Healthy Lifestyle: Care Instructions. \" Current as of: May 12, 2017 Content Version: 11.4 © 4890-2370 [employer]. Care instructions adapted under license by [email or web address] (which disclaims liability or warranty for this information). If you have questions about a medical condition or this instruction, always ask your healthcare professional. [place] [address] any warranty or liability for your use of this information. Introducing [facility]! Aldo Hastings introduces Zecter patient portal. Now you can access parts of your medical record, email your doctor's office, and request medication refills online. 1. In your internet browser, go to [email or web address] 2. Click on the First Time User? Click Here link in the Sign In box. You will see the New Member Sign Up page. 3. Enter your Zecter Access Code exactly as it appears below. You will not need to use this code after youve completed the sign-up process. If you do not sign up before the expiration date, you must request a new code. · Zecter Access Code: [email or web address] Expires: 9/2/2018  8:59 AM 
 
4. Enter the last four digits of your Social Security Number (xxxx) and Date of Birth (mm/dd/yyyy) as indicated and click Submit.  You will be taken to the next sign-up page. 5. Create a Zinc software ID. This will be your Zinc software login ID and cannot be changed, so think of one that is secure and easy to remember. 6. Create a Zinc software password. You can change your password at any time. 7. Enter your Password Reset Question and Answer. This can be used at a later time if you forget your password. 8. Enter your e-mail address. You will receive e-mail notification when new information is available in [address]. 9. Click Sign Up. You can now view and download portions of your medical record. 10. Click the Download Summary menu link to download a portable copy of your medical information. If you have questions, please visit the Frequently Asked Questions section of the Zinc software website. Remember, Zinc software is NOT to be used for urgent needs. For medical emergencies, dial 911. Now available from your iPhone and Android! Please provide this summary of care documentation to your next provider. Your primary care clinician is listed as Keo Castillo. If you have any questions after today's visit, please call [phone].

## 2018-06-04 NOTE — PROGRESS NOTES
Ms. Ashli Clemente is here to follow up migraines. She reports a sinus headache for four continuous days last weak but attributes it to sinus congestion. She denies any migraine since last visit.

## 2018-06-07 ENCOUNTER — TELEPHONE (OUTPATIENT)
Dept: NEUROLOGY | Age: 43
End: 2018-06-07

## 2018-06-07 NOTE — TELEPHONE ENCOUNTER
----- Message from Rainer Grigsby sent at 6/7/2018 12:27 PM EDT -----  Regarding: Dr. Khan Single  Pt called concerning her having a migraine and the medication hasn't completely took it away and she would like a call back.  Pt best contact number [phone]

## 2018-06-07 NOTE — TELEPHONE ENCOUNTER
Spoke with pt. She is not totally sure if this is 100% migraine or if there is a sinus issue involved too. She took the imitrex this morning and got some relief but her headache did not go away. She did not take the 2nd dose. At this point she will wait til the morning when it is the worse, then take the med, followed by the second dose 2 hours later if needed. If this does not get rid of her headache, she will call us back.

## 2018-06-08 ENCOUNTER — TELEPHONE (OUTPATIENT)
Dept: NEUROLOGY | Age: 43
End: 2018-06-08

## 2018-06-08 RX ORDER — METHYLPREDNISOLONE 4 MG/1
TABLET ORAL
Qty: 1 DOSE PACK | Refills: 0 | Status: SHIPPED | OUTPATIENT
Start: 2018-06-08 | End: 2019-02-08 | Stop reason: SDUPTHER

## 2018-06-08 NOTE — TELEPHONE ENCOUNTER
Kimberly Spicer - Please call pt: I have sent a medrol dose pack to her pharmacy. What does she mean by \"related to her medication\" ? What med?

## 2018-06-08 NOTE — TELEPHONE ENCOUNTER
Spoke with this pt again today. She is now having daily migraine. The imitrex is not really of tremendous help. The first dose helps some then she gets a rebound worsening headache about hour and half after taking it that brings her to tears. By the time she takes the second dose she is nauseous, light sensitive and can barely tolerate it. Now 5 hours later she still has a headache but can function somewhat. She wonders what to do going in to the weekend. She can't take too many more days like this. Please advse.

## 2018-06-08 NOTE — TELEPHONE ENCOUNTER
Patient is having migraines and believes it is related to her medication. Needs to know what to do if it continues tomorrow.

## 2019-02-08 ENCOUNTER — OFFICE VISIT (OUTPATIENT)
Dept: NEUROLOGY | Age: 44
End: 2019-02-08

## 2019-02-08 VITALS
WEIGHT: 146 LBS | SYSTOLIC BLOOD PRESSURE: 102 MMHG | HEIGHT: 67 IN | DIASTOLIC BLOOD PRESSURE: 60 MMHG | BODY MASS INDEX: 22.91 KG/M2 | HEART RATE: 60 BPM | OXYGEN SATURATION: 96 % | RESPIRATION RATE: 16 BRPM

## 2019-02-08 DIAGNOSIS — G43.009 MIGRAINE WITHOUT AURA AND WITHOUT STATUS MIGRAINOSUS, NOT INTRACTABLE: Primary | ICD-10-CM

## 2019-02-08 DIAGNOSIS — G44.219 EPISODIC TENSION-TYPE HEADACHE, NOT INTRACTABLE: ICD-10-CM

## 2019-02-08 RX ORDER — MONTELUKAST SODIUM 10 MG/1
10 TABLET ORAL DAILY
COMMUNITY

## 2019-02-08 RX ORDER — RIZATRIPTAN BENZOATE 10 MG/1
10 TABLET ORAL
Qty: 27 TAB | Refills: 3 | Status: SHIPPED | OUTPATIENT
Start: 2019-02-08 | End: 2019-02-08

## 2019-02-08 RX ORDER — METHYLPREDNISOLONE 4 MG/1
TABLET ORAL
Qty: 1 DOSE PACK | Refills: 0 | Status: SHIPPED | OUTPATIENT
Start: 2019-02-08 | End: 2021-06-03 | Stop reason: ALTCHOICE

## 2019-02-08 NOTE — PROGRESS NOTES
Neurology Consult Note HISTORY PROVIDED BY: patient Chief Complaint:  
Chief Complaint Patient presents with  Neurologic Problem Subjective:  
Pt is a 43 y.o. right handed female last seen in clinic 18 in f/u for HA. She initially presented in 2017 with c/o onset of HAs in the previous few years, with pain typically starting in back of head/neck, but then feels like a tight band around her head, +Photophobia, increasing in frequency. HAs improved with PT. Still with occasional HA lasting up to 4 days. Exam was non-focal and unremarkable. Possible migraine headaches and tension headaches triggered by neck pain and muscle tension, as well as allergies. Discussed use of abortive meds, may try ibuprofen or Imitrex 50mg tabs at onset of HA. Paresthesia over right mid back likely musculoskeletal in etiology due to muscle tension. Suggested she try myofacial release. She returns for f/u. She called the clinic 3 days after her last visit c/o persistent HA not responding to Imitrex, given a medrol dose pack. Pt reports no severe HAs since . She is having milder HAs, none in Sept, Nov, Dec, typically pain is only 2-3/10 when she does get a HA. She was allergy tested and allergic to a lot of things, using Flonase. Past Medical History:  
Diagnosis Date  Abnormal Pap smear Tx. with medication 4 yr ago, negative now  Acne  Anemia NEC   
 mild pregnancy related  Environmental allergies [address] Partial Previa with current Pregnancy Winter 2011  HX OTHER MEDICAL   
 treated for 2 weeks with Diflucan 100mg qd,mastitis, abx for 5 days starting on 3/16/2011  Lesion of nipple   
 nipples abraded bilaterally  Rh negative status during pregnancy 2013  Unspecified breast disorder   
 mastitis, 3/16/2011 Past Surgical History:  
Procedure Laterality Date  HX  SECTION    HX  SECTION    
 ,   HX OTHER SURGICAL    
 wisdom teeth removed  HX OTHER SURGICAL    
 D&C  02/10  
 HX WISDOM TEETH EXTRACTION Social History Socioeconomic History  Marital status:  Spouse name: Not on file  Number of children: Not on file  Years of education: Not on file  Highest education level: Not on file Social Needs  Financial resource strain: Not on file  Food insecurity - worry: Not on file  Food insecurity - inability: Not on file  Transportation needs - medical: Not on file  Transportation needs - non-medical: Not on file Occupational History  Occupation: 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 Tobacco Use  Smoking status: Never Smoker  Smokeless tobacco: Never Used Substance and Sexual Activity  Alcohol use: Yes Alcohol/week: 1.2 - 1.8 oz Types: 2 - 3 Glasses of wine per week  Drug use: No  
 Sexual activity: Yes  
  Partners: Male Other Topics Concern  Not on file Social History Narrative ** Merged History Encounter ** Lives in [place] with  and 2 sons, 2yo and 7yo Family History Problem Relation Age of Onset  Headache Mother  Heart Disease Maternal Grandmother  Cancer Maternal Grandfather   
     bladder/Bladder CA  Dementia Paternal Grandmother  Hypertension Maternal Grandfather Objective:  
Review of Systems : Per HPI No Known Allergies Meds: 
 
Current Outpatient Medications:  
  montelukast (SINGULAIR) 10 mg tablet, Take 10 mg by mouth daily. , Disp: , Rfl:  
  SUMAtriptan (IMITREX) 50 mg tablet, Take 1 tab by mouth at onset of headache, may repeat x 1 after 2 hours if needed. Limit 2 in 24 hours, Disp: 9 Tab, Rfl: 3 
  fluticasone (FLONASE) 50 mcg/actuation nasal spray, 2 Sprays by Both Nostrils route daily. , Disp: , Rfl:  
  clindamycin-tretinoin (Gailestoney Baba) 1.2-0.025 % topical gel, Apply  to affected area nightly.  apply pea-sized amount TOPICALLY to entire face at bedtime; dot onto chin, cheeks, nose, and forehead then gently rub over entire face., Disp: , Rfl:  
  LEVONORGESTREL (MIRENA IU), by IntraUTERine route., Disp: , Rfl:  
  ibuprofen (MOTRIN) 600 mg tablet, Take 1 Tab by mouth every eight (8) hours as needed for Pain., Disp: 36 Tab, Rfl: 2 
  methylPREDNISolone (MEDROL DOSEPACK) 4 mg tablet, Take as directed on packet, Disp: 1 Dose Pack, Rfl: 0 Imaging: MRI Results (most recent): No results found for this or any previous visit. CT Results (most recent): No results found for this or any previous visit. Reviewed records in [facility] and [facility] [facility] tab today Lab Review Results for orders placed or performed during the hospital encounter of 06/27/13 GYN RAPID GP B STREP Result Value Ref Range GrBStrep, External negative CBC WITH AUTOMATED DIFF Result Value Ref Range WBC 8.0 3.6 - 11.0 K/uL  
 RBC 3.78 (L) 3.80 - 5.20 M/uL  
 HGB 12.4 11.5 - 16.0 g/dL HCT 36.1 35.0 - 47.0 % MCV 95.5 80.0 - 99.0 FL  
 MCH 32.8 26.0 - 34.0 PG  
 MCHC 34.3 30.0 - 36.5 g/dL  
 RDW 13.0 11.5 - 14.5 % PLATELET 827 (L) 366 - 400 K/uL NEUTROPHILS 68 32 - 75 % LYMPHOCYTES 22 12 - 49 % MONOCYTES 10 5 - 13 % EOSINOPHILS 0 0 - 7 % BASOPHILS 0 0 - 1 %  
 ABS. NEUTROPHILS 5.4 1.8 - 8.0 K/UL  
 ABS. LYMPHOCYTES 1.8 0.8 - 3.5 K/UL  
 ABS. MONOCYTES 0.8 0.0 - 1.0 K/UL  
 ABS. EOSINOPHILS 0.0 0.0 - 0.4 K/UL  
 ABS. BASOPHILS 0.0 0.0 - 0.1 K/UL  
CBC WITH AUTOMATED DIFF Result Value Ref Range WBC 8.9 3.6 - 11.0 K/uL  
 RBC 3.22 (L) 3.80 - 5.20 M/uL  
 HGB 10.6 (L) 11.5 - 16.0 g/dL HCT 31.1 (L) 35.0 - 47.0 % MCV 96.6 80.0 - 99.0 FL  
 MCH 32.9 26.0 - 34.0 PG  
 MCHC 34.1 30.0 - 36.5 g/dL  
 RDW 13.1 11.5 - 14.5 % PLATELET 242 (L) 216 - 400 K/uL NEUTROPHILS 76 (H) 32 - 75 % LYMPHOCYTES 16 12 - 49 % MONOCYTES 7 5 - 13 % EOSINOPHILS 1 0 - 7 % BASOPHILS 0 0 - 1 %  
 ABS. NEUTROPHILS 6.8 1.8 - 8.0 K/UL ABS. LYMPHOCYTES 1.4 0.8 - 3.5 K/UL  
 ABS. MONOCYTES 0.7 0.0 - 1.0 K/UL  
 ABS. EOSINOPHILS 0.0 0.0 - 0.4 K/UL  
 ABS. BASOPHILS 0.0 0.0 - 0.1 K/UL  
TYPE & SCREEN Result Value Ref Range Crossmatch Expiration 06/30/2013 ABO/Rh(D) O NEG Antibody screen NEG Exam: 
Visit Vitals /60 Pulse 60 Resp 16 Ht 5' 7\" (1.702 m) Wt 66.2 kg (146 lb) SpO2 96% BMI 22.87 kg/m² General:  Alert, cooperative, no distress. Head:  Normocephalic, without obvious abnormality, atraumatic. Respiratory: 
Heart:   Non labored breathing Regular rate and rhythm, no murmurs Neck:     
Extremities: Warm, no cyanosis or edema. Pulses: 2+ radial pulses. Neurologic:  MS: Alert and oriented x 4, speech intact. Language intact. Attention and fund of knowledge appropriate. Recent and remote memory intact. Cranial Nerves: 
II: visual fields VFF II: pupils PERRL  
II: optic disc   
III,VII: ptosis none III,IV,VI: extraocular muscles  EOMI, no nystagmus or diplopia V: facial light touch sensation VII: facial muscle function   symmetric VIII: hearing intact IX: soft palate elevation  Elevates symmetrically XI: trapezius strength XI: sternocleidomastoid strength XII: tongue  midline Motor: normal bulk and tone, no tremor Strength: 5/5 throughout, no PD Sensory: 
Coordination: FTN intact Gait: normal gait Reflexes:  
 
  
 
 
Assessment/Plan Pt is a 43 y.o. right handed female initially presented in Nov, 2017 with c/o onset of HAs in the previous few years, with pain typically starting in back of head/neck, but then feels like a tight band around her head, +Photophobia, increasing in frequency. HAs improved with PT/treatment of allergies. Still with occasional HA lasting up to 4 days. Exam is non-focal and unremarkable. Possible migraine headaches and tension headaches triggered by neck pain and muscle tension, as well as allergies. Will start trial of Maxalt 10mg daily for abortive therapy. Pt given Rx for Medrol dose pack to have on hand if HA lasts more than 3 days. F/u in clinic in six months, instructed to call in the interim if needed. ICD-10-CM ICD-9-CM 1. Migraine without aura and without status migrainosus, not intractable G43.009 346.10 2. Episodic tension-type headache, not intractable G44.219 339.11 Signed: Kaushik Scott MD 
2/8/2019

## 2019-02-08 NOTE — PROGRESS NOTES
Ms Sourav Calderón is here to follow up headaches. Her last headache was in June. Depression screening done on this patient.

## 2019-02-08 NOTE — PATIENT INSTRUCTIONS
A Healthy Lifestyle: Care Instructions Your Care Instructions A healthy lifestyle can help you feel good, stay at a healthy weight, and have plenty of energy for both work and play. A healthy lifestyle is something you can share with your whole family. A healthy lifestyle also can lower your risk for serious health problems, such as high blood pressure, heart disease, and diabetes. You can follow a few steps listed below to improve your health and the health of your family. Follow-up care is a key part of your treatment and safety. Be sure to make and go to all appointments, and call your doctor if you are having problems. It's also a good idea to know your test results and keep a list of the medicines you take. How can you care for yourself at home? · Do not eat too much sugar, fat, or fast foods. You can still have dessert and treats now and then. The goal is moderation. · Start small to improve your eating habits. Pay attention to portion sizes, drink less juice and soda pop, and eat more fruits and vegetables. ? Eat a healthy amount of food. A 3-ounce serving of meat, for example, is about the size of a deck of cards. Fill the rest of your plate with vegetables and whole grains. ? Limit the amount of soda and sports drinks you have every day. Drink more water when you are thirsty. ? Eat at least 5 servings of fruits and vegetables every day. It may seem like a lot, but it is not hard to reach this goal. A serving or helping is 1 piece of fruit, 1 cup of vegetables, or 2 cups of leafy, raw vegetables. Have an apple or some carrot sticks as an afternoon snack instead of a candy bar. Try to have fruits and/or vegetables at every meal. 
· Make exercise part of your daily routine. You may want to start with simple activities, such as walking, bicycling, or slow swimming. Try to be active 30 to 60 minutes every day.  You do not need to do all 30 to 60 minutes all at once. For example, you can exercise 3 times a day for 10 or 20 minutes. Moderate exercise is safe for most people, but it is always a good idea to talk to your doctor before starting an exercise program. 
· Keep moving. [place] the lawn, work in the garden, or [employer]. Take the stairs instead of the elevator at work. · If you smoke, quit. People who smoke have an increased risk for heart attack, stroke, cancer, and other lung illnesses. Quitting is hard, but there are ways to boost your chance of quitting tobacco for good. ? Use nicotine gum, patches, or lozenges. ? Ask your doctor about stop-smoking programs and medicines. ? Keep trying. In addition to reducing your risk of diseases in the future, you will notice some benefits soon after you stop using tobacco. If you have shortness of breath or asthma symptoms, they will likely get better within a few weeks after you quit. · Limit how much alcohol you drink. Moderate amounts of alcohol (up to 2 drinks a day for men, 1 drink a day for women) are okay. But drinking too much can lead to liver problems, high blood pressure, and other health problems. Family health If you have a family, there are many things you can do together to improve your health. · Eat meals together as a family as often as possible. · Eat healthy foods. This includes fruits, vegetables, lean meats and dairy, and whole grains. · Include your family in your fitness plan. Most people think of activities such as jogging or tennis as the way to fitness, but there are many ways you and your family can be more active. Anything that makes you breathe hard and gets your heart pumping is exercise. Here are some tips: 
? Walk to do errands or to take your child to school or the bus. 
? Go for a family bike ride after dinner instead of watching TV. Where can you learn more? Go to http://isael-ada.info/. Enter C583 in the search box to learn more about \"A Healthy Lifestyle: Care Instructions. \" Current as of: September 11, 2018 Content Version: 11.9 © 6035-2384 [employer]. Care instructions adapted under license by [employer] (which disclaims liability or warranty for this information). If you have questions about a medical condition or this instruction, always ask your healthcare professional. [place] [address] any warranty or liability for your use of this information.

## 2019-02-28 ENCOUNTER — HOSPITAL ENCOUNTER (OUTPATIENT)
Dept: LAB | Age: 44
Discharge: HOME | End: 2019-02-28

## 2019-03-20 ENCOUNTER — TELEPHONE (OUTPATIENT)
Dept: NEUROLOGY | Age: 44
End: 2019-03-20

## 2019-03-20 NOTE — TELEPHONE ENCOUNTER
Brittany Lozano -Noted. Temporal arteritis usually causes pain and not redness of sclera.  Agree with plan to see her NP.

## 2019-03-20 NOTE — TELEPHONE ENCOUNTER
Patient reports a migraine since yesterday. She has taken Rizatriptan twice and the headache has resolved. She denies nausea and vomiting but reports redness of the sclera of left eye. This appeared at the onset of the migraine. She denies any eye pain. . 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 She does have an appointment in the AM to see a NP, with [facility]. She agrees to keep the appt with NP but wanted to ensure that Dr. Karen Ocampo is aware that her mother has history of temporal ateritis. Please advise.    (Patient is aware that Dr. Karen Ocampo is out of the office this week)

## 2019-03-20 NOTE — TELEPHONE ENCOUNTER
Patient calling in regards to migraine attack and veins have bursted in patient's left eye. Eye doctor doesn't seem to be concerned but patient is concerned. Please advise.     Best # [phone]

## 2019-08-14 ENCOUNTER — OFFICE VISIT (OUTPATIENT)
Dept: NEUROLOGY | Age: 44
End: 2019-08-14

## 2019-08-14 VITALS
HEIGHT: 67 IN | HEART RATE: 68 BPM | DIASTOLIC BLOOD PRESSURE: 60 MMHG | BODY MASS INDEX: 22.29 KG/M2 | WEIGHT: 142 LBS | OXYGEN SATURATION: 98 % | RESPIRATION RATE: 16 BRPM | SYSTOLIC BLOOD PRESSURE: 84 MMHG

## 2019-08-14 DIAGNOSIS — G43.009 MIGRAINE WITHOUT AURA AND WITHOUT STATUS MIGRAINOSUS, NOT INTRACTABLE: Primary | ICD-10-CM

## 2019-08-14 RX ORDER — SUMATRIPTAN 20 MG/1
1 SPRAY NASAL AS NEEDED
Qty: 15 CONTAINER | Refills: 1 | Status: SHIPPED | OUTPATIENT
Start: 2019-08-14 | End: 2021-06-03

## 2019-08-14 RX ORDER — RIZATRIPTAN BENZOATE 10 MG/1
10 TABLET ORAL
COMMUNITY
End: 2019-08-14

## 2019-08-14 NOTE — LETTER
8/17/19 Patient: [facility] YOB: 1975 Date of Visit: 8/14/2019 Trang Alvarez MD 
[address] [id number] VIA Facsimile: [phone] Dear Trang Alvarez MD, Thank you for referring Ms. Ciara Whitaker to [address] [place] [address] for evaluation. My notes for this consultation are attached. If you have questions, please do not hesitate to call me. I look forward to following your patient along with you. Sincerely, Lestine Lesch, MD

## 2019-08-14 NOTE — PROGRESS NOTES
Ms. Ivy Perry presents today to follow up headaches. She has had one headache since last visit in March. The headache lasted several days. Depression screening done on patient.

## 2019-08-14 NOTE — PATIENT INSTRUCTIONS
PRESCRIPTION REFILL POLICY [facility] Statement to Patients April 1, 2014 In an effort to ensure the large volume of patient prescription refills is processed in the most efficient and expeditious manner, we are asking our patients to assist us by calling your Pharmacy for all prescription refills, this will include also your  Mail Order Pharmacy. The pharmacy will contact our office electronically to continue the refill process. Please do not wait until the last minute to call your pharmacy. We need at least 48 hours (2days) to fill prescriptions. We also encourage you to call your pharmacy before going to  your prescription to make sure it is ready. With regard to controlled substance prescription refill requests (narcotic refills) that need to be picked up at our office, we ask your cooperation by providing us with at least 72 hours (3days) notice that you will need a refill. We will not refill narcotic prescription refill requests after 4:00pm on any weekday, Monday through Thursday, or after 2:00pm on Fridays, or on the weekends. We encourage everyone to explore another way of getting your prescription refill request processed using [employer], our patient web portal through our electronic medical record system. [employer] is an efficient and effective way to communicate your medication request directly to the office and  downloadable as an fox on your smart phone . [employer] also features a review functionality that allows you to view your medication list as well as leave messages for your physician. Are you ready to get connected? If so please review the attatched instructions or speak to any of our staff to get you set up right away! Thank you so much for your cooperation. Should you have any questions please contact our Practice Administrator. The Physicians and Staff,  [facility]

## 2019-08-14 NOTE — PROGRESS NOTES
Neurology Consult Note      HISTORY PROVIDED BY: patient    Chief Complaint:   Chief Complaint   Patient presents with    Headache      Subjective:   Pt is a 37 y.o. right handed female last seen in clinic on 19 in f/u for HAs. Initially presented in 2017 with c/o onset of HAs in the previous few years, with pain typically starting in back of head/neck, but then feels like a tight band around her head, +Photophobia, increasing in frequency. HAs improved with PT/treatment of allergies. Still with occasional HA lasting up to 4 days. Exam was non-focal and unremarkable. Possible migraine headaches and tension headaches triggered by neck pain and muscle tension, as well as allergies. Started trial of Maxalt 10mg daily for abortive therapy. Pt given Rx for Medrol dose pack to have on hand if HA lasts more than 3 days. She returns for f/u. Only one stretch of HAs in March, lasted 5 days. She tried Maxalt, obviously didn't abort the HA even after two doses. She feels like these are cluster migraines. Her HAs start about 15 minutes after waking, but some of the more recent ones started later in day, may have tearing in eye, pain is severe, +Photophobia, no N/V.       Past Medical History:   Diagnosis Date    Abnormal Pap smear     Tx. with medication 4 yr ago, negative now    Acne     Anemia NEC     mild pregnancy related    Environmental allergies     HX OTHER MEDICAL     Partial Previa with current Pregnancy Winter 2011    HX OTHER MEDICAL     treated for 2 weeks with Diflucan 100mg qd,mastitis, abx for 5 days starting on 3/16/2011    Lesion of nipple     nipples abraded bilaterally    Rh negative status during pregnancy 2013    Unspecified breast disorder     mastitis, 3/16/2011      Past Surgical History:   Procedure Laterality Date    HX  SECTION      HX  SECTION      ,     HX OTHER SURGICAL      wisdom teeth removed    HX OTHER SURGICAL      D&C  02/10  HX WISDOM TEETH EXTRACTION        Social History     Socioeconomic History    Marital status:      Spouse name: Not on file    Number of children: Not on file    Years of education: Not on file    Highest education level: Not on file   Occupational History    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[place] [address] resource strain: Not on file    Food insecurity:     Worry: Not on file     Inability: Not on file   Teach Me To Be needs:     Medical: Not on file     Non-medical: Not on file   Tobacco Use    Smoking status: Never Smoker    Smokeless tobacco: Never Used   Substance and Sexual Activity    Alcohol use:  Yes     Alcohol/week: 2.0 - 3.0 standard drinks     Types: 2 - 3 Glasses of wine per week    Drug use: No    Sexual activity: Yes     Partners: Male   Lifestyle    Physical activity:     Days per week: Not on file     Minutes per session: Not on file    Stress: Not on file   Relationships    Social connections:     Talks on phone: Not on file     Gets together: Not on file     Attends Rastafari service: Not on file     Active member of club or organization: Not on file     Attends meetings of clubs or organizations: Not on file     Relationship status: Not on file    Intimate partner violence:     Fear of current or ex partner: Not on file     Emotionally abused: Not on file     Physically abused: Not on file     Forced sexual activity: Not on file   Other Topics Concern    Not on file   Social History Narrative    ** Merged History Encounter **         Lives in [address] [facility] with  and 2 sons, 2yo and 7yo     Family History   Problem Relation Age of Onset    Headache Mother     Heart Disease Maternal Grandmother     Cancer Maternal Grandfather         bladder/Bladder CA    Dementia Paternal Grandmother     Hypertension Maternal Grandfather          Objective:   Review of Systems : Per HPI    No Known Allergies     Meds:    Current Outpatient Medications:   rizatriptan (MAXALT) 10 mg tablet, Take 10 mg by mouth once as needed for Migraine. May repeat in 2 hours if needed, Disp: , Rfl:     montelukast (SINGULAIR) 10 mg tablet, Take 10 mg by mouth daily. , Disp: , Rfl:     fluticasone (FLONASE) 50 mcg/actuation nasal spray, 2 Sprays by Both Nostrils route daily. , Disp: , Rfl:     clindamycin-tretinoin (Charlann Ronnie) 1.2-0.025 % topical gel, Apply  to affected area nightly. apply pea-sized amount TOPICALLY to entire face at bedtime; dot onto chin, cheeks, nose, and forehead then gently rub over entire face., Disp: , Rfl:     LEVONORGESTREL (MIRENA IU), by IntraUTERine route., Disp: , Rfl:     ibuprofen (MOTRIN) 600 mg tablet, Take 1 Tab by mouth every eight (8) hours as needed for Pain., Disp: 36 Tab, Rfl: 2    methylPREDNISolone (MEDROL DOSEPACK) 4 mg tablet, Take as directed on packet, Disp: 1 Dose Pack, Rfl: 0    SUMAtriptan (IMITREX) 50 mg tablet, Take 1 tab by mouth at onset of headache, may repeat x 1 after 2 hours if needed. Limit 2 in 24 hours, Disp: 9 Tab, Rfl: 3    Imaging:  MRI Results (most recent):  No results found for this or any previous visit. CT Results (most recent):  No results found for this or any previous visit. Reviewed records in [employer] tab today    Lab Review   Results for orders placed or performed during the hospital encounter of 06/27/13   GYN RAPID GP B STREP   Result Value Ref Range    GrBStrep, External negative    CBC WITH AUTOMATED DIFF   Result Value Ref Range    WBC 8.0 3.6 - 11.0 K/uL    RBC 3.78 (L) 3.80 - 5.20 M/uL    HGB 12.4 11.5 - 16.0 g/dL    HCT 36.1 35.0 - 47.0 %    MCV 95.5 80.0 - 99.0 FL    MCH 32.8 26.0 - 34.0 PG    MCHC 34.3 30.0 - 36.5 g/dL    RDW 13.0 11.5 - 14.5 %    PLATELET 386 (L) 367 - 400 K/uL    NEUTROPHILS 68 32 - 75 %    LYMPHOCYTES 22 12 - 49 %    MONOCYTES 10 5 - 13 %    EOSINOPHILS 0 0 - 7 %    BASOPHILS 0 0 - 1 %    ABS. NEUTROPHILS 5.4 1.8 - 8.0 K/UL    ABS.  LYMPHOCYTES 1.8 0.8 - 3.5 K/UL    ABS. MONOCYTES 0.8 0.0 - 1.0 K/UL    ABS. EOSINOPHILS 0.0 0.0 - 0.4 K/UL    ABS. BASOPHILS 0.0 0.0 - 0.1 K/UL   CBC WITH AUTOMATED DIFF   Result Value Ref Range    WBC 8.9 3.6 - 11.0 K/uL    RBC 3.22 (L) 3.80 - 5.20 M/uL    HGB 10.6 (L) 11.5 - 16.0 g/dL    HCT 31.1 (L) 35.0 - 47.0 %    MCV 96.6 80.0 - 99.0 FL    MCH 32.9 26.0 - 34.0 PG    MCHC 34.1 30.0 - 36.5 g/dL    RDW 13.1 11.5 - 14.5 %    PLATELET 947 (L) 858 - 400 K/uL    NEUTROPHILS 76 (H) 32 - 75 %    LYMPHOCYTES 16 12 - 49 %    MONOCYTES 7 5 - 13 %    EOSINOPHILS 1 0 - 7 %    BASOPHILS 0 0 - 1 %    ABS. NEUTROPHILS 6.8 1.8 - 8.0 K/UL    ABS. LYMPHOCYTES 1.4 0.8 - 3.5 K/UL    ABS. MONOCYTES 0.7 0.0 - 1.0 K/UL    ABS. EOSINOPHILS 0.0 0.0 - 0.4 K/UL    ABS. BASOPHILS 0.0 0.0 - 0.1 K/UL   TYPE & SCREEN   Result Value Ref Range    Crossmatch Expiration 06/30/2013     ABO/Rh(D) O NEG     Antibody screen NEG         Exam:  Visit Vitals  BP (!) 84/60   Pulse 68   Resp 16   Ht 5' 7\" (1.702 m)   Wt 64.4 kg (142 lb)   SpO2 98%   BMI 22.24 kg/m²     General:  Alert, cooperative, no distress. Head:  Normocephalic, without obvious abnormality, atraumatic. Respiratory:  Heart:   Non labored breathing  Regular rate and rhythm, no murmurs   Neck:      Extremities: Warm, no cyanosis or edema. Pulses: 2+ radial pulses. Neurologic:  MS: Alert and oriented x 4, speech intact. Language intact. Attention and fund of knowledge appropriate. Recent and remote memory intact.   Cranial Nerves:  II: visual fields VFF   II: pupils PERRL   II: optic disc    III,VII: ptosis none   III,IV,VI: extraocular muscles  EOMI, no nystagmus or diplopia   V: facial light touch sensation     VII: facial muscle function   symmetric   VIII: hearing intact   IX: soft palate elevation  Elevates symmetrically   XI: trapezius strength     XI: sternocleidomastoid strength    XII: tongue  midline     Motor: normal bulk and tone, no tremor              Strength: 5/5 throughout, no PD  Sensory:  Coordination: FTN intact  Gait: normal gait  Reflexes:            Assessment/Plan   Pt is a 37 y.o. right handed female with HAs, initially presented in Nov, 2017 with c/o onset of HAs in the previous few years, with pain typically starting in back of head/neck, but then feels like a tight band around her head, +Photophobia, increasing in frequency, improved with PT/treatment of allergies. Still with rare 4-5 day HA not responding to abortive therapy. Exam is non-focal and unremarkable. Discussed use of abortive therapy. Will start trial of sumatriptan nasal spray. If nasal spray does not help, discussed starting Medrol dose pack early on in HA given that they have not responded to any abortive therapies thus far. F/u in clinic in six months, instructed to call in the interim if needed. ICD-10-CM ICD-9-CM    1. Migraine without aura and without status migrainosus, not intractable G43.009 346.10        Signed:   Christina Chisholm MD  8/14/2019

## 2019-08-22 ENCOUNTER — TELEPHONE (OUTPATIENT)
Dept: NEUROLOGY | Age: 44
End: 2019-08-22

## 2019-08-22 NOTE — TELEPHONE ENCOUNTER
----- Message from Nikhil Puente sent at 8/22/2019 12:41 PM EDT -----  Regarding: Dr. Connor Akins from [facility] requesting a call back for clarification on medications. Best contact [phone] ref # [id number].

## 2021-06-04 ENCOUNTER — OFFICE VISIT (OUTPATIENT)
Dept: INTERNAL MEDICINE CLINIC | Age: 46
End: 2021-06-04
Payer: COMMERCIAL

## 2021-06-04 VITALS
BODY MASS INDEX: 21.82 KG/M2 | TEMPERATURE: 98 F | HEART RATE: 68 BPM | SYSTOLIC BLOOD PRESSURE: 117 MMHG | RESPIRATION RATE: 16 BRPM | DIASTOLIC BLOOD PRESSURE: 78 MMHG | HEIGHT: 67 IN | OXYGEN SATURATION: 100 % | WEIGHT: 139 LBS

## 2021-06-04 DIAGNOSIS — Z11.59 NEED FOR HEPATITIS C SCREENING TEST: ICD-10-CM

## 2021-06-04 DIAGNOSIS — R73.09 ABNORMAL GLUCOSE: ICD-10-CM

## 2021-06-04 DIAGNOSIS — Z00.00 GENERAL MEDICAL EXAM: Primary | ICD-10-CM

## 2021-06-04 PROBLEM — J30.9 ALLERGIC RHINITIS: Status: ACTIVE | Noted: 2021-06-04

## 2021-06-04 PROBLEM — G43.009 MIGRAINE WITHOUT AURA AND WITHOUT STATUS MIGRAINOSUS, NOT INTRACTABLE: Status: ACTIVE | Noted: 2021-06-04

## 2021-06-04 PROBLEM — Z88.9 PREDISPOSITION TO ALLERGIC REACTION: Status: ACTIVE | Noted: 2021-06-04

## 2021-06-04 PROCEDURE — 99386 PREV VISIT NEW AGE 40-64: CPT | Performed by: NURSE PRACTITIONER

## 2021-06-04 RX ORDER — CETIRIZINE HCL 10 MG
10 TABLET ORAL DAILY
COMMUNITY
End: 2022-09-08

## 2021-06-04 NOTE — PROGRESS NOTES
Verified name and birth date for privacy precautions. Chart reviewed in preparation for today's visit. Chief Complaint   Patient presents with   [address] Maintenance Due   Topic    Hepatitis C Screening     COVID-19 Vaccine (1)    DTaP/Tdap/Td series (1 - Tdap)    PAP AKA CERVICAL CYTOLOGY     Lipid Screen          Wt Readings from Last 3 Encounters:   06/04/21 139 lb (63 kg)   08/14/19 142 lb (64.4 kg)   02/08/19 146 lb (66.2 kg)     Temp Readings from Last 3 Encounters:   06/04/21 98 °F (36.7 °C) (Temporal)   07/01/13 98.2 °F (36.8 °C)   07/16/12 98 °F (36.7 °C)     BP Readings from Last 3 Encounters:   06/04/21 117/78   08/14/19 (!) 84/60   02/08/19 102/60     Pulse Readings from Last 3 Encounters:   06/04/21 68   08/14/19 68   02/08/19 60         Learning Assessment:  :     Learning Assessment 2/8/2019 11/20/2017   PRIMARY LEARNER Patient Patient   PRIMARY LANGUAGE ENGLISH ENGLISH   LEARNER PREFERENCE PRIMARY DEMONSTRATION READING   ANSWERED BY self patient   RELATIONSHIP SELF SELF       Depression Screening:  :     3 most recent PHQ Screens 6/4/2021   Little interest or pleasure in doing things Not at all   Feeling down, depressed, irritable, or hopeless Not at all   Total Score PHQ 2 0       Fall Risk Assessment:  :     No flowsheet data found. Abuse Screening:  :     Abuse Screening Questionnaire 6/4/2021   Do you ever feel afraid of your partner? N   Are you in a relationship with someone who physically or mentally threatens you? N   Is it safe for you to go home?  David Sibley

## 2021-06-11 LAB
ALBUMIN SERPL-MCNC: 4.6 G/DL (ref 3.8–4.8)
ALBUMIN/GLOB SERPL: 2 {RATIO} (ref 1.2–2.2)
ALP SERPL-CCNC: 59 IU/L (ref 48–121)
ALT SERPL-CCNC: 19 IU/L (ref 0–32)
AST SERPL-CCNC: 20 IU/L (ref 0–40)
BASOPHILS # BLD AUTO: 0 X10E3/UL (ref 0–0.2)
BASOPHILS NFR BLD AUTO: 1 %
BILIRUB SERPL-MCNC: 0.6 MG/DL (ref 0–1.2)
BUN SERPL-MCNC: 11 MG/DL (ref 6–24)
BUN/CREAT SERPL: 13 (ref 9–23)
CALCIUM SERPL-MCNC: 9.7 MG/DL (ref 8.7–10.2)
CHLORIDE SERPL-SCNC: 105 MMOL/L (ref 96–106)
CHOLEST SERPL-MCNC: 171 MG/DL (ref 100–199)
CO2 SERPL-SCNC: 26 MMOL/L (ref 20–29)
CREAT SERPL-MCNC: 0.83 MG/DL (ref 0.57–1)
EOSINOPHIL # BLD AUTO: 0.1 X10E3/UL (ref 0–0.4)
EOSINOPHIL NFR BLD AUTO: 1 %
ERYTHROCYTE [DISTWIDTH] IN BLOOD BY AUTOMATED COUNT: 11.7 % (ref 11.7–15.4)
EST. AVERAGE GLUCOSE BLD GHB EST-MCNC: 105 MG/DL
GLOBULIN SER CALC-MCNC: 2.3 G/DL (ref 1.5–4.5)
GLUCOSE SERPL-MCNC: 85 MG/DL (ref 65–99)
HBA1C MFR BLD: 5.3 % (ref 4.8–5.6)
HCT VFR BLD AUTO: 40.3 % (ref 34–46.6)
HCV AB S/CO SERPL IA: <0.1 S/CO RATIO (ref 0–0.9)
HDLC SERPL-MCNC: 73 MG/DL
HGB BLD-MCNC: 13.4 G/DL (ref 11.1–15.9)
IMM GRANULOCYTES # BLD AUTO: 0 X10E3/UL (ref 0–0.1)
IMM GRANULOCYTES NFR BLD AUTO: 0 %
LDLC SERPL CALC-MCNC: 85 MG/DL (ref 0–99)
LYMPHOCYTES # BLD AUTO: 2.3 X10E3/UL (ref 0.7–3.1)
LYMPHOCYTES NFR BLD AUTO: 39 %
MCH RBC QN AUTO: 31.9 PG (ref 26.6–33)
MCHC RBC AUTO-ENTMCNC: 33.3 G/DL (ref 31.5–35.7)
MCV RBC AUTO: 96 FL (ref 79–97)
MONOCYTES # BLD AUTO: 0.6 X10E3/UL (ref 0.1–0.9)
MONOCYTES NFR BLD AUTO: 10 %
NEUTROPHILS # BLD AUTO: 2.8 X10E3/UL (ref 1.4–7)
NEUTROPHILS NFR BLD AUTO: 49 %
PLATELET # BLD AUTO: 209 X10E3/UL (ref 150–450)
POTASSIUM SERPL-SCNC: 4.4 MMOL/L (ref 3.5–5.2)
PROT SERPL-MCNC: 6.9 G/DL (ref 6–8.5)
RBC # BLD AUTO: 4.2 X10E6/UL (ref 3.77–5.28)
SODIUM SERPL-SCNC: 144 MMOL/L (ref 134–144)
TRIGL SERPL-MCNC: 69 MG/DL (ref 0–149)
TSH SERPL DL<=0.005 MIU/L-ACNC: 2.52 UIU/ML (ref 0.45–4.5)
VLDLC SERPL CALC-MCNC: 13 MG/DL (ref 5–40)
WBC # BLD AUTO: 5.8 X10E3/UL (ref 3.4–10.8)

## 2021-06-14 NOTE — PATIENT INSTRUCTIONS
Well Visit, Ages 25 to 48: Care Instructions  Overview     Well visits can help you stay healthy. Your doctor has checked your overall health and may have suggested ways to take good care of yourself. Your doctor also may have recommended tests. At home, you can help prevent illness with healthy eating, regular exercise, and other steps. Follow-up care is a key part of your treatment and safety. Be sure to make and go to all appointments, and call your doctor if you are having problems. It's also a good idea to know your test results and keep a list of the medicines you take. How can you care for yourself at home? · Get screening tests that you and your doctor decide on. Screening helps find diseases before any symptoms appear. · Eat healthy foods. Choose fruits, vegetables, whole grains, protein, and low-fat dairy foods. Limit fat, especially saturated fat. Reduce salt in your diet. · Limit alcohol. If you are a man, have no more than 2 drinks a day or 14 drinks a week. If you are a woman, have no more than 1 drink a day or 7 drinks a week. · Get at least 30 minutes of physical activity on most days of the week. Walking is a good choice. You also may want to do other activities, such as running, swimming, cycling, or playing tennis or team sports. Discuss any changes in your exercise program with your doctor. · Reach and stay at a healthy weight. This will lower your risk for many problems, such as obesity, diabetes, heart disease, and high blood pressure. · Do not smoke or allow others to smoke around you. If you need help quitting, talk to your doctor about stop-smoking programs and medicines. These can increase your chances of quitting for good. · Care for your mental health. It is easy to get weighed down by worry and stress. Learn strategies to manage stress, like deep breathing and mindfulness, and stay connected with your family and community.  If you find you often feel sad or hopeless, talk with your doctor. Treatment can help. · Talk to your doctor about whether you have any risk factors for sexually transmitted infections (STIs). You can help prevent STIs if you wait to have sex with a new partner (or partners) until you've each been tested for STIs. It also helps if you use condoms (male or female condoms) and if you limit your sex partners to one person who only has sex with you. Vaccines are available for some STIs, such as HPV. · Use birth control if it's important to you to prevent pregnancy. Talk with your doctor about the choices available and what might be best for you. · If you think you may have a problem with alcohol or drug use, talk to your doctor. This includes prescription medicines (such as amphetamines and opioids) and illegal drugs (such as cocaine and methamphetamine). Your doctor can help you figure out what type of treatment is best for you. · Protect your skin from too much sun. When you're outdoors from 10 a.m. to 4 p.m., stay in the shade or cover up with clothing and a hat with a wide brim. Wear sunglasses that block UV rays. Even when it's cloudy, put broad-spectrum sunscreen (SPF 30 or higher) on any exposed skin. · See a dentist one or two times a year for checkups and to have your teeth cleaned. · Wear a seat belt in the car. When should you call for help? Watch closely for changes in your health, and be sure to contact your doctor if you have any problems or symptoms that concern you. Where can you learn more? Go to http://www.[employer].com.com/  Enter P072 in the search box to learn more about \"Well Visit, Ages 25 to 48: Care Instructions. \"  Current as of: May 27, 2020               Content Version: 12.8  © 0589-0366 [employer]. Care instructions adapted under license by [employer] (which disclaims liability or warranty for this information).  If you have questions about a medical condition or this instruction, always ask your healthcare professional. [address] [address] any warranty or liability for your use of this information.

## 2021-06-14 NOTE — PROGRESS NOTES
Cyndee Matos is a 39 y.o. female who was seen in clinic today (6/4/2021) for a full physical and to establish care. Assessment & Plan:   Diagnoses and all orders for this visit:    1. General medical exam  Comments:  Recommended screenings reviewed with pt. Labs ordered. WWE per GYN. Pt will check on immunizations. Advised routine vision/dental exams. Orders:  -     LIPID PANEL; Future  -     CBC WITH AUTOMATED DIFF; Future  -     TSH 3RD GENERATION; Future  -     METABOLIC PANEL, COMPREHENSIVE; Future  -     HEPATITIS C AB; Future    2. BMI 21.0-21.9, adult    3. Need for hepatitis C screening test  -     HEPATITIS C AB; Future    4. Abnormal glucose  -     HEMOGLOBIN A1C WITH EAG; Future      Follow-up and Dispositions    · Return in about 1 year (around 6/4/2022), or as needed, for Annual physical.       ------------------------------------------------------------------------------------------    Subjective:   Pieter Hamman is here today for a full physical and to establish care. No recent PCP. Primarily has been following with OBGYN-Dr. Ban Lovett has Mirena IUD. Hx of elevated glucose during pregnancy. Hx of environmental allergies and migraine HA that were thought to be related-currently well controlled on regimen of Zyrtec, Flonase PRN, and Singulair. No acute complaints today. Works as [employer]. Active-exercises. Health Maintenance  Immunizations:   Influenza: n/a   Tetanus: unknown, record requested    COVID vaccine: Pt aware to bring in copy of completed card to the office to update her records  Pneumonia: N/A    Cancer screening:   Cervical: reviewed guidelines, UTD, done by OBGYN, records requested  Breast: reviewed guidelines, UTD, done by OBGYN, records requested.    Colorectal: reviewed guidelines-pt will consider and notify office for orders if agreeable        Patient Care Team:  LIZETTE Jon as PCP - General (Nurse Practitioner)  Alfred Duenas MD as PCP - OBGYN (Obstetrics & Gynecology)  LIZETTE Merchant as PCP - [facility] Empaneled Provider  Hayden Troy MD (Obstetrics & Gynecology)  Maria Esther Leigh MD (Allergy)  Matty Muñoz MD (Dermatology)  Jesus Montero MD (Neurology)         The following sections were reviewed & updated as appropriate: PMH, PSH, FH, and SH. 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 with medication 4 yr ago, negative now    Acne     Anemia NEC     mild pregnancy related    Environmental allergies     HX OTHER MEDICAL     Partial Previa with current Pregnancy Winter 2011    HX OTHER MEDICAL     treated for 2 weeks with Diflucan 100mg qd,mastitis, abx for 5 days starting on 3/16/2011    Lesion of nipple     nipples abraded bilaterally    Nipple fissure 2011    Rh negative status during pregnancy 2013    Unspecified breast disorder     mastitis, 3/16/2011     Past Surgical History:   Procedure Laterality Date    HX  SECTION      HX  SECTION      2013    HX OTHER SURGICAL      wisdom teeth removed    HX OTHER SURGICAL      D&C  02/10    HX OTHER SURGICAL      Grafting of gum    HX WISDOM TEETH EXTRACTION       Family History   Problem Relation Age of Onset    Headache Mother     Heart Disease Maternal Grandmother     Cancer Maternal Grandfather         bladder/Bladder CA    Dementia Paternal Grandmother     Hypertension Maternal Grandfather          Prior to Admission medications    Medication Sig Start Date End Date Taking? Authorizing Provider   cetirizine (ZyrTEC) 10 mg tablet Take 10 mg by mouth daily.    Yes Provider, Historical   montelukast (SINGULAIR) 10 mg tablet Take 10 mg by mouth daily. Yes Provider, Historical   fluticasone (FLONASE) 50 mcg/actuation nasal spray 2 Sprays by Both Nostrils route daily. Yes Provider, Historical   clindamycin-tretinoin ([place]) 1.2-0.025 % topical gel Apply  to affected area nightly. apply pea-sized amount TOPICALLY to entire face at bedtime; dot onto chin, cheeks, nose, and forehead then gently rub over entire face. Yes Provider, Historical   LEVONORGESTREL (MIRENA IU) by IntraUTERine route. Yes Provider, Historical          No Known Allergies           Review of Systems   Constitutional: Negative for chills, diaphoresis, fever, malaise/fatigue and weight loss. HENT: Negative. Eyes: Negative. Respiratory: Negative for cough, hemoptysis, sputum production, shortness of breath and wheezing. Cardiovascular: Negative for chest pain, palpitations, orthopnea, claudication and leg swelling. Gastrointestinal: Positive for constipation (on occasion). Negative for abdominal pain, blood in stool, diarrhea, heartburn, melena, nausea and vomiting. Genitourinary: Negative. Musculoskeletal: Negative. Skin: Negative. Neurological: Negative. Endo/Heme/Allergies: Positive for environmental allergies. Negative for polydipsia. Does not bruise/bleed easily. Psychiatric/Behavioral: Negative. Objective:   Physical Exam  Vitals reviewed. Constitutional:       General: She is not in acute distress. Appearance: Normal appearance. She is well-developed and well-groomed. HENT:      Head: Normocephalic and atraumatic. Right Ear: Tympanic membrane, ear canal and external ear normal.      Left Ear: Tympanic membrane, ear canal and external ear normal.      Nose: Nose normal.      Mouth/Throat:      Mouth: Mucous membranes are moist.      Pharynx: Oropharynx is clear. Eyes:      General: No scleral icterus. Extraocular Movements: Extraocular movements intact.       Conjunctiva/sclera: Conjunctivae normal.   Neck:      Thyroid: No thyromegaly. Vascular: No carotid bruit or JVD. Cardiovascular:      Rate and Rhythm: Normal rate and regular rhythm. Pulses: Normal pulses. Heart sounds: Normal heart sounds, S1 normal and S2 normal.   Pulmonary:      Effort: Pulmonary effort is normal. No respiratory distress. Breath sounds: Normal breath sounds. Abdominal:      General: Bowel sounds are normal. There is no distension. Palpations: Abdomen is soft. There is no hepatomegaly or splenomegaly. Tenderness: There is no abdominal tenderness. Musculoskeletal:         General: Normal range of motion. Cervical back: Normal range of motion and neck supple. Right lower leg: No edema. Left lower leg: No edema. Lymphadenopathy:      Cervical: No cervical adenopathy. Skin:     General: Skin is warm and dry. Neurological:      Mental Status: She is alert and oriented to person, place, and time. Sensory: No sensory deficit. Motor: No weakness. Coordination: Coordination normal.      Gait: Gait normal.   Psychiatric:         Mood and Affect: Mood normal.         Behavior: Behavior normal. Behavior is cooperative. Thought Content: Thought content normal.         Judgment: Judgment normal.            Visit Vitals  /78 (BP 1 Location: Left arm, BP Patient Position: Sitting, BP Cuff Size: Adult)   Pulse 68   Temp 98 °F (36.7 °C) (Temporal)   Resp 16   Ht 5' 7\" (1.702 m)   Wt 139 lb (63 kg)   SpO2 100%   Breastfeeding No   BMI 21.77 kg/m²          Advised her to call back or return to office if symptoms worsen/change/persist.  Discussed expected course/resolution/complications of diagnosis in detail with patient. Medication risks/benefits/costs/interactions/alternatives discussed with patient. She was given an after visit summary which includes diagnoses, current medications, & vitals.   She expressed understanding and agrees with the diagnosis and plan.         Noemi Shoemaker, CRYSTALP

## 2021-12-08 ENCOUNTER — OFFICE VISIT (OUTPATIENT)
Dept: INTERNAL MEDICINE CLINIC | Age: 46
End: 2021-12-08
Payer: COMMERCIAL

## 2021-12-08 VITALS
SYSTOLIC BLOOD PRESSURE: 105 MMHG | RESPIRATION RATE: 16 BRPM | WEIGHT: 149.2 LBS | HEIGHT: 67 IN | TEMPERATURE: 97.9 F | OXYGEN SATURATION: 98 % | HEART RATE: 74 BPM | BODY MASS INDEX: 23.42 KG/M2 | DIASTOLIC BLOOD PRESSURE: 73 MMHG

## 2021-12-08 DIAGNOSIS — H60.553 ACUTE REACTIVE OTITIS EXTERNA OF BOTH EARS: Primary | ICD-10-CM

## 2021-12-08 DIAGNOSIS — H61.23 BILATERAL IMPACTED CERUMEN: ICD-10-CM

## 2021-12-08 PROCEDURE — 99213 OFFICE O/P EST LOW 20 MIN: CPT | Performed by: NURSE PRACTITIONER

## 2021-12-08 PROCEDURE — 69209 REMOVE IMPACTED EAR WAX UNI: CPT | Performed by: NURSE PRACTITIONER

## 2021-12-08 RX ORDER — NEOMYCIN SULFATE, POLYMYXIN B SULFATE AND HYDROCORTISONE 10; 3.5; 1 MG/ML; MG/ML; [USP'U]/ML
3 SUSPENSION/ DROPS AURICULAR (OTIC) 3 TIMES DAILY
Qty: 10 ML | Refills: 0 | Status: SHIPPED | OUTPATIENT
Start: 2021-12-08 | End: 2021-12-15

## 2021-12-08 NOTE — PROGRESS NOTES
Lennie May is a 55 y.o. female who was seen in clinic today (12/8/2021). Assessment & Plan:   1. Acute reactive otitis externa of both ears  Comments:  Pediotic as [id number]-[id number] sent. Orders:  -     neomycin-polymyxin-hydrocortisone, buffered, (PEDIOTIC) 3.5-10,000-1 mg/mL-unit/mL-% otic suspension; Administer 3 Drops into each ear three (3) times daily for 7 days. , Normal, Disp-10 mL, R-0  2. Bilateral impacted cerumen  Comments:  soft-removed with irrigation without difficulty-pt tolerated procedure well  Orders:  -     REMOVAL IMPACTED CERUMEN IRRIGATION/LVG UNILAT    Follow-up and Dispositions    · Return if symptoms worsen or fail to improve. Subjective:   Ciara was seen today for Ear Pain (bilateral/Itching)  Hx of impacted cerumen. Past several weeks-Bilateral ear itching and drainage with mild intermittent discomfort. Has been using Debrox and irrigation, but has not seen any wax come out. Has noticed some crusting bilateral ears and palpable nodule right ear canal. Denies loss of hearing, congestion, fevers, swollen glands. Brief Labs:     Lab Results   Component Value Date/Time    Sodium 144 06/10/2021 08:37 AM    Potassium 4.4 06/10/2021 08:37 AM    Creatinine 0.83 06/10/2021 08:37 AM    Cholesterol, total 171 06/10/2021 08:37 AM    HDL Cholesterol 73 06/10/2021 08:37 AM    LDL, calculated 85 06/10/2021 08:37 AM    Triglyceride 69 06/10/2021 08:37 AM    Hemoglobin A1c 5.3 06/10/2021 08:37 AM    TSH 2.520 06/10/2021 08:37 AM          Prior to Admission medications    Medication Sig Start Date End Date Taking? Authorizing Provider   cetirizine (ZyrTEC) 10 mg tablet Take 10 mg by mouth daily. Yes Provider, Historical   montelukast (SINGULAIR) 10 mg tablet Take 10 mg by mouth daily. Yes Provider, Historical   fluticasone (FLONASE) 50 mcg/actuation nasal spray 2 Sprays by Both Nostrils route daily.    Yes Provider, Historical   clindamycin-tretinoin ([place]) 1.2-0.025 % topical gel Apply  to affected area nightly. apply pea-sized amount TOPICALLY to entire face at bedtime; dot onto chin, cheeks, nose, and forehead then gently rub over entire face. Yes Provider, Historical   LEVONORGESTREL (MIRENA IU) by IntraUTERine route. Yes Provider, Historical          No Known Allergies        ROS - per HPI    Objective:   Physical Exam  Vitals reviewed. HENT:      Right Ear: Hearing normal. There is impacted cerumen (soft-removed with irrigation without difficulty-pt tolerated procedure well. ). Left Ear: Hearing normal. There is impacted cerumen (soft-removed with irrigation without difficulty-pt tolerated procedure well). Ears:      Comments: Bilateral ear canals erythremic, mild tenderness, and small cyst vs pimple outer right canal. TM intact. Cardiovascular:      Rate and Rhythm: Normal rate and regular rhythm. Pulmonary:      Effort: Pulmonary effort is normal. No respiratory distress. Musculoskeletal:      Cervical back: Neck supple. Skin:     General: Skin is warm and dry. Neurological:      Mental Status: She is oriented to person, place, and time. Psychiatric:         Mood and Affect: Mood normal.         Behavior: Behavior normal.           Visit Vitals  /73 (BP 1 Location: Right arm, BP Patient Position: Sitting, BP Cuff Size: Large adult)   Pulse 74   Temp 97.9 °F (36.6 °C) (Oral)   Resp 16   Ht 5' 7\" (1.702 m)   Wt 149 lb 3.2 oz (67.7 kg)   SpO2 98%   BMI 23.37 kg/m²         Disclaimer:  Advised her to call back or return to office if symptoms worsen/change/persist.  Discussed expected course/resolution/complications of diagnosis in detail with patient. Medication risks/benefits/costs/interactions/alternatives discussed with patient. She was given an after visit summary which includes diagnoses, current medications, & vitals. She expressed understanding and agrees with the diagnosis and plan.         LIZETTE Newman

## 2021-12-08 NOTE — LETTER
12/8/2021 11:55 AM    Ms. Adelfo [place] [id number]        Dear Dr. Alex Shukla    Please fax us the most recent mammogram report so that we may update the patient's records for continuity of care.      Our fax number: [phone]    Patient:   Jaspal Haskins  1975                    Sincerely,      LIZETTE Carrera

## 2021-12-08 NOTE — PATIENT INSTRUCTIONS
Earwax Blockage: Care Instructions  Your Care Instructions     Earwax is a natural substance that protects the ear canal. Normally, earwax drains from the ears and does not cause problems. Sometimes earwax builds up and hardens. Earwax blockage (also called cerumen impaction) can cause some loss of hearing and pain. When wax is tightly packed, you will need to have your doctor remove it. Follow-up care is a key part of your treatment and safety. Be sure to make and go to all appointments, and call your doctor if you are having problems. It's also a good idea to know your test results and keep a list of the medicines you take. How can you care for yourself at home? · Do not try to remove earwax with cotton swabs, fingers, or other objects. This can make the blockage worse and damage the eardrum. · If your doctor recommends that you try to remove earwax at home:  ? Soften and loosen the earwax with warm mineral oil. You also can try hydrogen peroxide mixed with an equal amount of room temperature water. Place 2 drops of the fluid, warmed to body temperature, in the ear two times a day for up to 5 days. ? Once the wax is loose and soft, all that is usually needed to remove it from the ear canal is a gentle, warm shower. Direct the water into the ear, then tip your head to let the earwax drain out. Dry your ear thoroughly with a hair dryer set on low. Hold the dryer several inches from your ear. ? If the warm mineral oil and shower do not work, use an over-the-counter wax softener. Read and follow all instructions on the label. After using the wax softener, use an ear syringe to gently flush the ear. Make sure the flushing solution is body temperature. Cool or hot fluids in the ear can cause dizziness. When should you call for help? Call your doctor now or seek immediate medical care if:    · Pus or blood drains from your ear.     · Your ears are ringing or feel full.     · You have a loss of hearing. Watch closely for changes in your health, and be sure to contact your doctor if:    · You have pain or reduced hearing after 1 week of home treatment.     · You have any new symptoms, such as nausea or balance problems. Where can you learn more? Go to http://www.gray.com/  Enter Q495 in the search box to learn more about \"Earwax Blockage: Care Instructions. \"  Current as of: July 1, 2021               Content Version: 13.0  © 2006-2021 [employer]. Care instructions adapted under license by [employer] (which disclaims liability or warranty for this information). If you have questions about a medical condition or this instruction, always ask your healthcare professional. [address] any warranty or liability for your use of this information.

## 2022-03-18 PROBLEM — J30.9 ALLERGIC RHINITIS: Status: ACTIVE | Noted: 2021-06-04

## 2022-03-19 PROBLEM — Z88.9 PREDISPOSITION TO ALLERGIC REACTION: Status: ACTIVE | Noted: 2021-06-04

## 2022-03-19 PROBLEM — G43.009 MIGRAINE WITHOUT AURA AND WITHOUT STATUS MIGRAINOSUS, NOT INTRACTABLE: Status: ACTIVE | Noted: 2021-06-04

## 2022-09-08 ENCOUNTER — OFFICE VISIT (OUTPATIENT)
Dept: INTERNAL MEDICINE CLINIC | Age: 47
End: 2022-09-08
Payer: COMMERCIAL

## 2022-09-08 VITALS
OXYGEN SATURATION: 99 % | RESPIRATION RATE: 18 BRPM | SYSTOLIC BLOOD PRESSURE: 104 MMHG | TEMPERATURE: 98.2 F | HEIGHT: 67 IN | HEART RATE: 70 BPM | WEIGHT: 145.3 LBS | BODY MASS INDEX: 22.81 KG/M2 | DIASTOLIC BLOOD PRESSURE: 72 MMHG

## 2022-09-08 DIAGNOSIS — M72.2 PLANTAR FASCIITIS: ICD-10-CM

## 2022-09-08 DIAGNOSIS — K59.00 CONSTIPATION, UNSPECIFIED CONSTIPATION TYPE: Primary | ICD-10-CM

## 2022-09-08 DIAGNOSIS — Z23 ENCOUNTER FOR IMMUNIZATION: ICD-10-CM

## 2022-09-08 DIAGNOSIS — Z76.89 ESTABLISHING CARE WITH NEW DOCTOR, ENCOUNTER FOR: ICD-10-CM

## 2022-09-08 DIAGNOSIS — L08.9 SKIN PUSTULE: ICD-10-CM

## 2022-09-08 PROCEDURE — 90471 IMMUNIZATION ADMIN: CPT | Performed by: FAMILY MEDICINE

## 2022-09-08 PROCEDURE — 99204 OFFICE O/P NEW MOD 45 MIN: CPT | Performed by: FAMILY MEDICINE

## 2022-09-08 RX ORDER — MUPIROCIN 20 MG/G
OINTMENT TOPICAL DAILY
Qty: 22 G | Refills: 2 | Status: SHIPPED | OUTPATIENT
Start: 2022-09-08

## 2022-09-08 NOTE — PROGRESS NOTES
SPORTS MEDICINE AND PRIMARY CARE  Ran Beltrán MD  [address] [place] [address]    Chief Complaint   Patient presents with    [facility]         SUBJECTIVE:    Hansa Dale is a 55 y.o. female for care establishment. PMH of possible history of possible IBS-C and plantar fasciitis. S/p GI evaluation for chronic constipation. Trial of probiotics. Symptoms are improved. Colonoscopy set for November. Evaluation of plantar fasciitis has included podiatry evaluation and physical therapy. Patient experiences general tightness involving the plantar aspect of the right foot, symptoms peak in the morning. Overall condition is modestly improved. History of running 3 miles a day. Pain reports chronic recurring itchy red pustular rash above the upper lip at the base of the nose. She had an evaluation. Topical steroids were recommended. Area flares up from time to time.   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      mupirocin (BACTROBAN) 2 % ointment Apply  to affected area daily. 22 g 2    montelukast (SINGULAIR) 10 mg tablet Take 10 mg by mouth daily. fluticasone propionate (FLONASE) 50 mcg/actuation nasal spray 2 Sprays by Both Nostrils route daily. clindamycin-tretinoin (VELTIN) 1.2-0.025 % topical gel Apply  to affected area nightly. apply pea-sized amount TOPICALLY to entire face at bedtime; dot onto chin, cheeks, nose, and forehead then gently rub over entire face. LEVONORGESTREL (MIRENA IU) by IntraUTERine route.        Past Medical History:   Diagnosis Date    Abnormal Pap smear     Tx. with medication 4 yr ago, negative now    Acne     Anemia NEC     mild pregnancy related    Environmental allergies     HX OTHER MEDICAL     Partial Previa with current Pregnancy Winter 2011    [facility]     treated for 2 weeks with Diflucan 100mg qd,mastitis, abx for 5 days starting on 3/16/2011    Lesion of nipple     nipples abraded bilaterally    Nipple fissure 2011    Rh negative status during pregnancy 2013    Unspecified breast disorder     mastitis, 3/16/2011     Past Surgical History:   Procedure Laterality Date    HX  SECTION      HX  SECTION      ,     HX OTHER SURGICAL      wisdom teeth removed    HX OTHER SURGICAL      D&C  02/10    HX OTHER SURGICAL      Grafting of gum    HX WISDOM TEETH EXTRACTION       No Known Allergies    REVIEW OF SYSTEMS:  General: negative for - chills or fever  ENT: negative for - headaches, nasal congestion, tinnitus, hearing loss, vision changes, sore throat  Respiratory: negative for - cough, hemoptysis, shortness of breath or wheezing  Cardiovascular : negative for - chest pain, edema, palpitations or shortness of breath  Gastrointestinal: +constipation ; negative for - abdominal pain, blood in stools, heartburn or nausea/vomiting, diarrhea,  Genito-Urinary: no dysuria, trouble voiding, hematuria   Musculoskeletal:  +joint pain,negative for - gait disturbance, joint stiffness , joint swelling, muscle aches  Neurological: no TIA or stroke symptoms  Hematologic: no bruises, no bleeding, no swollen glands  Integument: +rash; no lumps, mole changes, nail changes  Endocrine:no malaise/lethargy or unexpected weight changes      Social History     Socioeconomic History    Marital status:    Occupational History    Occupation: 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[employer]   Tobacco Use    Smoking status: Never    Smokeless tobacco: Never   Vaping Use    Vaping Use: Never used   Substance and Sexual Activity    Alcohol use:  Yes     Alcohol/week: 2.0 - 3.0 standard drinks Types: 2 - 3 Glasses of wine per week    Drug use: No    Sexual activity: Yes     Partners: Male   Social History Narrative    ** Merged History Encounter **         Lives in [place] with  and 2 sons, 2yo and 5yo     Social Determinants of Health     Physical Activity: Sufficiently Active    Days of Exercise per Week: 4 days    Minutes of Exercise per Session: 40 min     Family History   Problem Relation Age of Onset    Headache Mother     Heart Disease Maternal Grandmother     Cancer Maternal Grandfather         bladder/Bladder CA    Dementia Paternal Grandmother     Hypertension Maternal Grandfather        OBJECTIVE:     Visit Vitals  /72 (BP 1 Location: Left upper arm, BP Patient Position: Sitting)   Pulse 70   Temp 98.2 °F (36.8 °C) (Oral)   Resp 18   Ht 5' 7\" (1.702 m)   Wt 145 lb 4.8 oz (65.9 kg)   LMP  (Exact Date)   SpO2 99%   BMI 22.76 kg/m²     CONSTITUTIONAL: Pleasant, cooperative, in no acute distress  EYES: perrla, eom intact  ENMT:moist mucous membranes,   NECK: supple. Thyroid normal  RESPIRATORY: Chest: clear bilaterally  CARDIOVASCULAR: Heart: regular rate and rhythm  GASTROINTESTINAL: Abdomen: soft,   MUSCULOSKELETAL: Extremities: no edema,  INTEGUMENT: Warm and dry. Nails appear normal.  NEUROLOGIC: non-focal exam   MENTAL STATUS: alert, appropriate affect       ASSESSMENT/PLAN:  1. Constipation, unspecified constipation type . Continue conservative measures. Colonoscopy in November   2. Plantar fasciitis -poor response to conservative measures . sports medicine evaluation   3. Skin pustule -check MRSA status. Add Bactroban. Continue topical steroid. 4. Encounter for immunization    5. Establishing care with new doctor, encounter for    .   Orders Placed This Encounter    MRSA SCREENING CULTURE ([facility] Default)    Tetanus, diphtheria toxoids and acellular pertussis (TDAP) vaccine, in individuals >=7 years, IM    REFERRAL TO SPORTS MEDICINE    loratadine 10 mg cap mupirocin (BACTROBAN) 2 % ointment         Follow-up and Dispositions    Return for annual wellness exam.         I have discussed the diagnosis with the patient and the intended plan as seen in the  orders above. The patient understands and agrees with the plan. The patient has   received an after visit summary. Questions were answered concerning  future plans  Patient labs and/or xrays were reviewed as available. Past records were reviewed as available. Counseled regarding  healthy lifestyle          Advised patient to all back or return to office if symptoms develop/worsen/change/persist.  Discussed expected course/resolution/complications of diagnosis in detail with patient. Medication risks/benefits/costs/interactions/alternatives discussed with patient    Erica Menjivar M.D. This note was created using voice recognition software.   Edits have been made but syntax errors might exist.

## 2022-09-08 NOTE — PROGRESS NOTES
Rudy Rogers is a 55 y.o. female    Chief Complaint   Patient presents with    Establish Care     1. Have you been to the ER, urgent care clinic since your last visit? Hospitalized since your last visit? No    2. Have you seen or consulted any other health care providers outside of the [address] [place] [address] since your last visit? Include any pap smears or colon screening.  No

## 2022-09-09 PROCEDURE — 90715 TDAP VACCINE 7 YRS/> IM: CPT | Performed by: FAMILY MEDICINE

## 2022-09-12 LAB — MRSA SPEC QL CULT: NEGATIVE

## 2023-02-23 ENCOUNTER — OFFICE VISIT (OUTPATIENT)
Dept: INTERNAL MEDICINE CLINIC | Age: 48
End: 2023-02-23
Payer: COMMERCIAL

## 2023-02-23 VITALS
RESPIRATION RATE: 20 BRPM | BODY MASS INDEX: 23.61 KG/M2 | WEIGHT: 150.4 LBS | HEART RATE: 69 BPM | OXYGEN SATURATION: 97 % | HEIGHT: 67 IN | SYSTOLIC BLOOD PRESSURE: 97 MMHG | TEMPERATURE: 98 F | DIASTOLIC BLOOD PRESSURE: 68 MMHG

## 2023-02-23 DIAGNOSIS — Z00.00 WELL ADULT ON ROUTINE HEALTH CHECK: Primary | ICD-10-CM

## 2023-02-23 DIAGNOSIS — K52.839 MICROSCOPIC COLITIS, UNSPECIFIED MICROSCOPIC COLITIS TYPE: ICD-10-CM

## 2023-02-23 DIAGNOSIS — J30.9 ALLERGIC RHINITIS, UNSPECIFIED SEASONALITY, UNSPECIFIED TRIGGER: ICD-10-CM

## 2023-02-23 PROCEDURE — 99396 PREV VISIT EST AGE 40-64: CPT | Performed by: STUDENT IN AN ORGANIZED HEALTH CARE EDUCATION/TRAINING PROGRAM

## 2023-02-23 NOTE — PROGRESS NOTES
Luis Reyes is a 52y.o. year old female who is a new patient to me today (23). She was previous followed by Dr Chino Galindo, [facility] prior to this. Assessment & Plan:   1. Well adult on routine health check  Comments:  reviewed diet, exercise, HM. Orders:  -     CBC W/O DIFF; Future  -     LIPID PANEL; Future  -     METABOLIC PANEL, COMPREHENSIVE; Future  2. Allergic rhinitis, unspecified seasonality, unspecified trigger  Assessment & Plan:  continue to follow with allergist   3. Microscopic colitis, unspecified microscopic colitis type  Assessment & Plan:  She is not sure if her symptoms warrant treatment. I advised her to discuss with GI. Health Maintenance   Flu vaccine: got it at work in early Nov  COVID vaccine: 2022  Tetanus vaccine: 2022  Shingles vaccine:  Pneumonia vaccine:  Cervical cancer screenin2021 q5yr  Breast cancer screening: through GYN  Colon cancer screenin2022 - Dr Doug Schultz - record requested  DEXA:  Hep C: 2021  Lipid: check today  DM: A1c 5.3% 2021  Healthcare decision maker:   ACP:      RTC: 1 year or sooner PRN    Subjective:   Mireya Downing was seen today for Establish Care    Allergies  - She had episodes of headaches and saw a neurologist but she realized they were due to seasonal allergies  - Allergiest Dr Glenn Mckeon at [facility]  - claritin  - contact dermatitis to flonase and azalastine  - she plans to restart allergy shot    Derm  - Dr Zeke Patino  - Dr Sherryle Morales  - Dr Doug Schultz  - possible IBS-C  - colonoscopy 2022 - microscopic colitis    Diet - none    Exercise - walk or run when she can - sometimes limited by plantar fascitis but tries to be active a couple times a week    Review of Systems   All other systems reviewed and are negative.     PMHx    Patient Active Problem List   Diagnosis Code    Allergic rhinitis J30.9    Predisposition to allergic reaction Z88.9    Migraine without aura and without status migrainosus, not intractable G43.009       Past Medical History:   Diagnosis Date    Abnormal Pap smear     Tx. with medication 4 yr ago, negative now    Acne     Anemia NEC     mild pregnancy related    Environmental allergies     Headache     Went away when i startwd allergy medicine but they have just restarted this summer    [facility]     Partial Previa with current Pregnancy Winter 2011    [facility]     treated for 2 weeks with Diflucan 100mg qd,mastitis, abx for 5 days starting on 3/16/2011    Lesion of nipple     nipples abraded bilaterally    Nipple fissure 2011    Rh negative status during pregnancy 2013    Unspecified breast disorder     mastitis, 3/16/2011       Prior to Admission medications    Medication Sig Start Date End Date Taking? Authorizing Provider   loratadine 10 mg cap Take  by mouth daily as needed. Yes Provider, Historical   clindamycin-tretinoin (VELTIN) 1.2-0.025 % topical gel Apply  to affected area nightly. apply pea-sized amount TOPICALLY to entire face at bedtime; dot onto chin, cheeks, nose, and forehead then gently rub over entire face. Yes Provider, Historical   LEVONORGESTREL (MIRENA IU) by IntraUTERine route.    Yes Provider, Historical       PSHx    Past Surgical History:   Procedure Laterality Date    HX  SECTION      HX  SECTION      2013    HX OTHER SURGICAL      wisdom teeth removed    HX OTHER SURGICAL      D&C  02/10    HX OTHER SURGICAL      Grafting of gum    HX WISDOM TEETH EXTRACTION         FH    Family History   Problem Relation Age of Onset    Headache Mother         Temporal reumatitis    Heart Disease Maternal Grandmother         At age 80    Cancer Maternal Grandfather         Bladder    Hypertension Maternal Grandfather     Dementia Paternal Grandmother     Hypertension Father         SH    Social History     Occupational History    Occupation: Research economist for [employer]   Tobacco Use    Smoking status: Never Smokeless tobacco: Never   Vaping Use    Vaping Use: Never used   Substance and Sexual Activity    Alcohol use: Yes     Alcohol/week: 3.0 standard drinks     Types: 3 Glasses of wine per week     Comment: glass of wine per day with dinner    Drug use: Never    Sexual activity: Yes     Partners: Male     Birth control/protection: I.U.D. The following sections were reviewed & updated as appropriate: Problem List, Allergies, PMH, PSH, FH, and SH. Objective:   Visit Vitals  BP 97/68   Pulse 69   Temp 98 °F (36.7 °C)   Resp 20   Ht 5' 7\" (1.702 m)   Wt 150 lb 6.4 oz (68.2 kg)   SpO2 97%   BMI 23.56 kg/m²       Physical Exam  Eyes:      Extraocular Movements: Extraocular movements intact. Cardiovascular:      Rate and Rhythm: Normal rate and regular rhythm. Heart sounds: No murmur heard. Pulmonary:      Effort: Pulmonary effort is normal. No respiratory distress. Abdominal:      General: Bowel sounds are normal.      Palpations: Abdomen is soft. Musculoskeletal:      Right lower leg: No edema. Left lower leg: No edema. Neurological:      General: No focal deficit present. Mental Status: She is alert.    Psychiatric:         Mood and Affect: Mood normal.         Behavior: Behavior normal.            Severo Canton, MD

## 2023-02-24 PROBLEM — K52.839 MICROSCOPIC COLITIS: Status: ACTIVE | Noted: 2023-02-24

## 2023-02-24 PROBLEM — R51.9 SINUS HEADACHE: Status: ACTIVE | Noted: 2021-06-04

## 2023-02-27 DIAGNOSIS — Z00.00 WELL ADULT ON ROUTINE HEALTH CHECK: ICD-10-CM

## 2023-02-27 LAB
ALBUMIN SERPL-MCNC: 4 G/DL (ref 3.5–5)
ALBUMIN/GLOB SERPL: 1.4 (ref 1.1–2.2)
ALP SERPL-CCNC: 63 U/L (ref 45–117)
ALT SERPL-CCNC: 25 U/L (ref 12–78)
ANION GAP SERPL CALC-SCNC: 3 MMOL/L (ref 5–15)
AST SERPL-CCNC: 15 U/L (ref 15–37)
BILIRUB SERPL-MCNC: 0.5 MG/DL (ref 0.2–1)
BUN SERPL-MCNC: 15 MG/DL (ref 6–20)
BUN/CREAT SERPL: 19 (ref 12–20)
CALCIUM SERPL-MCNC: 9.2 MG/DL (ref 8.5–10.1)
CHLORIDE SERPL-SCNC: 109 MMOL/L (ref 97–108)
CHOLEST SERPL-MCNC: 178 MG/DL
CO2 SERPL-SCNC: 30 MMOL/L (ref 21–32)
CREAT SERPL-MCNC: 0.78 MG/DL (ref 0.55–1.02)
ERYTHROCYTE [DISTWIDTH] IN BLOOD BY AUTOMATED COUNT: 11.6 % (ref 11.5–14.5)
GLOBULIN SER CALC-MCNC: 2.9 G/DL (ref 2–4)
GLUCOSE SERPL-MCNC: 86 MG/DL (ref 65–100)
HCT VFR BLD AUTO: 39.9 % (ref 35–47)
HDLC SERPL-MCNC: 68 MG/DL
HDLC SERPL: 2.6 (ref 0–5)
HGB BLD-MCNC: 13 G/DL (ref 11.5–16)
LDLC SERPL CALC-MCNC: 98.4 MG/DL (ref 0–100)
MCH RBC QN AUTO: 31.1 PG (ref 26–34)
MCHC RBC AUTO-ENTMCNC: 32.6 G/DL (ref 30–36.5)
MCV RBC AUTO: 95.5 FL (ref 80–99)
NRBC # BLD: 0 K/UL (ref 0–0.01)
NRBC BLD-RTO: 0 PER 100 WBC
PLATELET # BLD AUTO: 217 K/UL (ref 150–400)
PMV BLD AUTO: 11.6 FL (ref 8.9–12.9)
POTASSIUM SERPL-SCNC: 3.9 MMOL/L (ref 3.5–5.1)
PROT SERPL-MCNC: 6.9 G/DL (ref 6.4–8.2)
RBC # BLD AUTO: 4.18 M/UL (ref 3.8–5.2)
SODIUM SERPL-SCNC: 142 MMOL/L (ref 136–145)
TRIGL SERPL-MCNC: 58 MG/DL (ref ?–150)
VLDLC SERPL CALC-MCNC: 11.6 MG/DL
WBC # BLD AUTO: 5.6 K/UL (ref 3.6–11)

## 2023-03-12 PROBLEM — Z12.11 COLON CANCER SCREENING: Status: ACTIVE | Noted: 2023-03-12

## 2023-04-11 PROBLEM — Z12.11 COLON CANCER SCREENING: Status: RESOLVED | Noted: 2023-03-12 | Resolved: 2023-04-11

## 2024-02-28 ENCOUNTER — OFFICE VISIT (OUTPATIENT)
Age: 49
End: 2024-02-28
Payer: COMMERCIAL

## 2024-02-28 VITALS
OXYGEN SATURATION: 96 % | TEMPERATURE: 97.5 F | HEIGHT: 67 IN | SYSTOLIC BLOOD PRESSURE: 109 MMHG | HEART RATE: 83 BPM | WEIGHT: 148.4 LBS | DIASTOLIC BLOOD PRESSURE: 74 MMHG | RESPIRATION RATE: 20 BRPM | BODY MASS INDEX: 23.29 KG/M2

## 2024-02-28 DIAGNOSIS — Z00.00 ROUTINE GENERAL MEDICAL EXAMINATION AT A HEALTH CARE FACILITY: Primary | ICD-10-CM

## 2024-02-28 DIAGNOSIS — K58.1 IRRITABLE BOWEL SYNDROME WITH CONSTIPATION: ICD-10-CM

## 2024-02-28 DIAGNOSIS — J30.9 ALLERGIC RHINITIS, UNSPECIFIED SEASONALITY, UNSPECIFIED TRIGGER: ICD-10-CM

## 2024-02-28 DIAGNOSIS — F90.9 ATTENTION DEFICIT HYPERACTIVITY DISORDER (ADHD), UNSPECIFIED ADHD TYPE: ICD-10-CM

## 2024-02-28 DIAGNOSIS — Z00.00 ROUTINE GENERAL MEDICAL EXAMINATION AT A HEALTH CARE FACILITY: ICD-10-CM

## 2024-02-28 PROBLEM — K52.839 MICROSCOPIC COLITIS: Status: RESOLVED | Noted: 2023-02-24 | Resolved: 2024-02-28

## 2024-02-28 LAB
ALBUMIN SERPL-MCNC: 4.1 G/DL (ref 3.5–5)
ALBUMIN/GLOB SERPL: 1.3 (ref 1.1–2.2)
ALP SERPL-CCNC: 68 U/L (ref 45–117)
ALT SERPL-CCNC: 26 U/L (ref 12–78)
ANION GAP SERPL CALC-SCNC: 1 MMOL/L (ref 5–15)
AST SERPL-CCNC: 16 U/L (ref 15–37)
BASOPHILS # BLD: 0 K/UL (ref 0–0.1)
BASOPHILS NFR BLD: 1 % (ref 0–1)
BILIRUB SERPL-MCNC: 0.5 MG/DL (ref 0.2–1)
BUN SERPL-MCNC: 9 MG/DL (ref 6–20)
BUN/CREAT SERPL: 10 (ref 12–20)
CALCIUM SERPL-MCNC: 9.4 MG/DL (ref 8.5–10.1)
CHLORIDE SERPL-SCNC: 107 MMOL/L (ref 97–108)
CO2 SERPL-SCNC: 29 MMOL/L (ref 21–32)
CREAT SERPL-MCNC: 0.88 MG/DL (ref 0.55–1.02)
DIFFERENTIAL METHOD BLD: NORMAL
EOSINOPHIL # BLD: 0.1 K/UL (ref 0–0.4)
EOSINOPHIL NFR BLD: 1 % (ref 0–7)
ERYTHROCYTE [DISTWIDTH] IN BLOOD BY AUTOMATED COUNT: 11.8 % (ref 11.5–14.5)
GLOBULIN SER CALC-MCNC: 3.2 G/DL (ref 2–4)
GLUCOSE SERPL-MCNC: 82 MG/DL (ref 65–100)
HCT VFR BLD AUTO: 40.8 % (ref 35–47)
HGB BLD-MCNC: 13.4 G/DL (ref 11.5–16)
IMM GRANULOCYTES # BLD AUTO: 0 K/UL (ref 0–0.04)
IMM GRANULOCYTES NFR BLD AUTO: 0 % (ref 0–0.5)
LYMPHOCYTES # BLD: 2.3 K/UL (ref 0.8–3.5)
LYMPHOCYTES NFR BLD: 42 % (ref 12–49)
MCH RBC QN AUTO: 31.1 PG (ref 26–34)
MCHC RBC AUTO-ENTMCNC: 32.8 G/DL (ref 30–36.5)
MCV RBC AUTO: 94.7 FL (ref 80–99)
MONOCYTES # BLD: 0.5 K/UL (ref 0–1)
MONOCYTES NFR BLD: 8 % (ref 5–13)
NEUTS SEG # BLD: 2.7 K/UL (ref 1.8–8)
NEUTS SEG NFR BLD: 48 % (ref 32–75)
NRBC # BLD: 0 K/UL (ref 0–0.01)
NRBC BLD-RTO: 0 PER 100 WBC
PLATELET # BLD AUTO: 236 K/UL (ref 150–400)
PMV BLD AUTO: 11.3 FL (ref 8.9–12.9)
POTASSIUM SERPL-SCNC: 4.1 MMOL/L (ref 3.5–5.1)
PROT SERPL-MCNC: 7.3 G/DL (ref 6.4–8.2)
RBC # BLD AUTO: 4.31 M/UL (ref 3.8–5.2)
SODIUM SERPL-SCNC: 137 MMOL/L (ref 136–145)
WBC # BLD AUTO: 5.6 K/UL (ref 3.6–11)

## 2024-02-28 PROCEDURE — 99396 PREV VISIT EST AGE 40-64: CPT | Performed by: STUDENT IN AN ORGANIZED HEALTH CARE EDUCATION/TRAINING PROGRAM

## 2024-02-28 RX ORDER — METHYLPHENIDATE HYDROCHLORIDE 40 MG/1
40 CAPSULE ORAL DAILY
COMMUNITY
Start: 2024-02-08

## 2024-02-28 RX ORDER — ESTRADIOL 0.1 MG/G
2 CREAM VAGINAL
COMMUNITY

## 2024-02-28 RX ORDER — LEVONORGESTREL 52 MG/1
1 INTRAUTERINE DEVICE INTRAUTERINE ONCE
COMMUNITY
Start: 2017-02-27

## 2024-02-28 SDOH — ECONOMIC STABILITY: HOUSING INSECURITY
IN THE LAST 12 MONTHS, WAS THERE A TIME WHEN YOU DID NOT HAVE A STEADY PLACE TO SLEEP OR SLEPT IN A SHELTER (INCLUDING NOW)?: NO

## 2024-02-28 SDOH — ECONOMIC STABILITY: FOOD INSECURITY: WITHIN THE PAST 12 MONTHS, YOU WORRIED THAT YOUR FOOD WOULD RUN OUT BEFORE YOU GOT MONEY TO BUY MORE.: NEVER TRUE

## 2024-02-28 SDOH — ECONOMIC STABILITY: FOOD INSECURITY: WITHIN THE PAST 12 MONTHS, THE FOOD YOU BOUGHT JUST DIDN'T LAST AND YOU DIDN'T HAVE MONEY TO GET MORE.: NEVER TRUE

## 2024-02-28 SDOH — ECONOMIC STABILITY: INCOME INSECURITY: HOW HARD IS IT FOR YOU TO PAY FOR THE VERY BASICS LIKE FOOD, HOUSING, MEDICAL CARE, AND HEATING?: NOT HARD AT ALL

## 2024-02-28 ASSESSMENT — PATIENT HEALTH QUESTIONNAIRE - PHQ9
SUM OF ALL RESPONSES TO PHQ QUESTIONS 1-9: 0
SUM OF ALL RESPONSES TO PHQ QUESTIONS 1-9: 0
2. FEELING DOWN, DEPRESSED OR HOPELESS: 0
1. LITTLE INTEREST OR PLEASURE IN DOING THINGS: 0
SUM OF ALL RESPONSES TO PHQ QUESTIONS 1-9: 0
SUM OF ALL RESPONSES TO PHQ QUESTIONS 1-9: 0
SUM OF ALL RESPONSES TO PHQ9 QUESTIONS 1 & 2: 0

## 2024-02-28 NOTE — ASSESSMENT & PLAN NOTE
New diagnosis. She is followed by [facility] and is working on medication options

## 2024-02-28 NOTE — PROGRESS NOTES
Monika Hazel is a 48 y.o. year old female who presents today (24) for annual physical exam.    Assessment & Plan:   1. Routine general medical examination at a health care facility  Reviewed diet and exercise habits - she does well with this. Updated health maintenance, see below. Check screening labs.   -     CBC with Auto Differential; Future  -     Comprehensive Metabolic Panel; Future  2. Attention deficit hyperactivity disorder (ADHD), unspecified ADHD type  Assessment & Plan:  New diagnosis. She is followed by [facility] and is working on medication options   3. Allergic rhinitis, unspecified seasonality, unspecified trigger  Assessment & Plan:  Followed by allergist Dr Rascon for allergy shots  4. Irritable bowel syndrome with constipation  Assessment & Plan:  She manages with high fiber diet       Health Maintenance   Flu vaccine: 2023  COVID vaccine: 2023  RSV:   Tetanus vaccine: 2022  Shingles vaccine:  Pneumonia vaccine:  Cervical cancer screenin2021 q5yr  Breast cancer screening: through GYN - scheduled for spring - will have sent to me after  Colon cancer screenin2022 - Dr Sams   DEXA:  Hep C: 2021  HIV:   Lipid: check today  DM: A1c 5.3% 2021  Healthcare decision maker:   ACP:    RTC: 1 year, sooner PRN    Subjective:   Monika was seen today for Annual Exam    Allergies  - Allergiest Dr Rascon at [facility]  - allergy shots - in the maintenance phase  - taking claritin PRN. Stopped singulair    She was diagnosed with ADHD by [facility], now followed by LINCOLN Noble there. She has tried vyvanse which made her jittery. Tried methylphenidate was OK but didn't seem to help.  Now trying Jornay.  She is also seeing a counciler in the practice weekly    Derm  - Dr Pruitt  - topical clinda-tretinoin     GYN  - Dr Marie --> Dr Isabel Grace  - mirena, estrace vaginal cream     GI  - Dr Sams  - possible IBS - has been doing

## 2024-07-23 ENCOUNTER — OFFICE VISIT (OUTPATIENT)
Age: 49
End: 2024-07-23
Payer: COMMERCIAL

## 2024-07-23 VITALS
HEART RATE: 93 BPM | TEMPERATURE: 98.4 F | SYSTOLIC BLOOD PRESSURE: 103 MMHG | WEIGHT: 144.2 LBS | OXYGEN SATURATION: 97 % | BODY MASS INDEX: 22.63 KG/M2 | RESPIRATION RATE: 16 BRPM | HEIGHT: 67 IN | DIASTOLIC BLOOD PRESSURE: 69 MMHG

## 2024-07-23 DIAGNOSIS — M54.50 ACUTE RIGHT-SIDED LOW BACK PAIN WITHOUT SCIATICA: ICD-10-CM

## 2024-07-23 DIAGNOSIS — R05.1 ACUTE COUGH: ICD-10-CM

## 2024-07-23 DIAGNOSIS — R53.83 FATIGUE, UNSPECIFIED TYPE: Primary | ICD-10-CM

## 2024-07-23 DIAGNOSIS — J20.9 ACUTE BRONCHITIS, UNSPECIFIED ORGANISM: ICD-10-CM

## 2024-07-23 PROCEDURE — 99213 OFFICE O/P EST LOW 20 MIN: CPT | Performed by: NURSE PRACTITIONER

## 2024-07-23 RX ORDER — METHYLPREDNISOLONE 4 MG/1
TABLET ORAL
Qty: 21 TABLET | Refills: 0 | Status: SHIPPED | OUTPATIENT
Start: 2024-07-23 | End: 2024-07-28

## 2024-07-23 ASSESSMENT — ENCOUNTER SYMPTOMS: COUGH: 1

## 2024-07-23 NOTE — PROGRESS NOTES
Monika Hazel (:  1975) is a 48 y.o. female,here for evaluation of the following chief complaint(s):  URI    /69   Pulse 93   Temp 98.4 °F (36.9 °C) (Temporal)   Resp 16   Ht 1.702 m (5' 7\")   Wt 65.4 kg (144 lb 3.2 oz)   SpO2 97%   BMI 22.58 kg/m²       SUBJECTIVE/OBJECTIVE:    HPI:Patient reports cough x 2 weeks. She has tested negative for Covid. No fever, but felt chills and aches initially. Cough is dry. She has not had a lot of congestion. She is very fatigued. She had mild sore throat at the beginning. No urinary symptoms. She also notes right sided low back pain, similar to past sciatica. She has taken NSAIDS and ice. She also tried warm compresses. She is getting no relief with these.    Review of Systems   Respiratory:  Positive for cough.        Physical Exam  Constitutional:       Appearance: Normal appearance.   HENT:      Head: Normocephalic.      Nose: Nose normal.      Mouth/Throat:      Pharynx: Oropharynx is clear.   Cardiovascular:      Rate and Rhythm: Normal rate and regular rhythm.   Pulmonary:      Effort: Pulmonary effort is normal.      Breath sounds: Normal breath sounds.   Musculoskeletal:      Comments: Right lumbar paraspinal pain with palpation   Skin:     General: Skin is warm and dry.   Neurological:      General: No focal deficit present.      Mental Status: She is alert and oriented to person, place, and time.   Psychiatric:         Mood and Affect: Mood normal.         Behavior: Behavior normal.          ASSESSMENT/PLAN:  1. Fatigue, unspecified type  -     methylPREDNISolone (MEDROL DOSEPACK) 4 MG tablet; Take as directed on package instructions, Disp-21 tablet, R-0Normal  -     CBC with Auto Differential; Future  -     Comprehensive Metabolic Panel; Future  -     Maria G Barr Virus (EBV) Antibody Panel I; Future  2. Acute cough  -     methylPREDNISolone (MEDROL DOSEPACK) 4 MG tablet; Take as directed on package instructions, Disp-21 tablet,

## 2024-07-24 LAB
ALBUMIN SERPL-MCNC: 3.7 G/DL (ref 3.5–5)
ALBUMIN/GLOB SERPL: 1 (ref 1.1–2.2)
ALP SERPL-CCNC: 71 U/L (ref 45–117)
ALT SERPL-CCNC: 18 U/L (ref 12–78)
ANION GAP SERPL CALC-SCNC: 6 MMOL/L (ref 5–15)
AST SERPL-CCNC: 8 U/L (ref 15–37)
BASOPHILS # BLD: 0 K/UL (ref 0–0.1)
BASOPHILS NFR BLD: 0 % (ref 0–1)
BILIRUB SERPL-MCNC: 0.2 MG/DL (ref 0.2–1)
BUN SERPL-MCNC: 9 MG/DL (ref 6–20)
BUN/CREAT SERPL: 11 (ref 12–20)
CALCIUM SERPL-MCNC: 9.5 MG/DL (ref 8.5–10.1)
CHLORIDE SERPL-SCNC: 105 MMOL/L (ref 97–108)
CO2 SERPL-SCNC: 30 MMOL/L (ref 21–32)
CREAT SERPL-MCNC: 0.79 MG/DL (ref 0.55–1.02)
DIFFERENTIAL METHOD BLD: ABNORMAL
EOSINOPHIL # BLD: 0.1 K/UL (ref 0–0.4)
EOSINOPHIL NFR BLD: 1 % (ref 0–7)
ERYTHROCYTE [DISTWIDTH] IN BLOOD BY AUTOMATED COUNT: 11.3 % (ref 11.5–14.5)
GLOBULIN SER CALC-MCNC: 3.6 G/DL (ref 2–4)
GLUCOSE SERPL-MCNC: 68 MG/DL (ref 65–100)
HCT VFR BLD AUTO: 39.8 % (ref 35–47)
HGB BLD-MCNC: 12.6 G/DL (ref 11.5–16)
IMM GRANULOCYTES # BLD AUTO: 0 K/UL (ref 0–0.04)
IMM GRANULOCYTES NFR BLD AUTO: 0 % (ref 0–0.5)
LYMPHOCYTES # BLD: 1.8 K/UL (ref 0.8–3.5)
LYMPHOCYTES NFR BLD: 20 % (ref 12–49)
MCH RBC QN AUTO: 31.3 PG (ref 26–34)
MCHC RBC AUTO-ENTMCNC: 31.7 G/DL (ref 30–36.5)
MCV RBC AUTO: 98.8 FL (ref 80–99)
MONOCYTES # BLD: 0.5 K/UL (ref 0–1)
MONOCYTES NFR BLD: 6 % (ref 5–13)
NEUTS SEG # BLD: 6.7 K/UL (ref 1.8–8)
NEUTS SEG NFR BLD: 73 % (ref 32–75)
NRBC # BLD: 0 K/UL (ref 0–0.01)
NRBC BLD-RTO: 0 PER 100 WBC
PLATELET # BLD AUTO: 308 K/UL (ref 150–400)
PMV BLD AUTO: 10.9 FL (ref 8.9–12.9)
POTASSIUM SERPL-SCNC: 3.9 MMOL/L (ref 3.5–5.1)
PROT SERPL-MCNC: 7.3 G/DL (ref 6.4–8.2)
RBC # BLD AUTO: 4.03 M/UL (ref 3.8–5.2)
SODIUM SERPL-SCNC: 141 MMOL/L (ref 136–145)
WBC # BLD AUTO: 9.2 K/UL (ref 3.6–11)

## 2024-07-26 LAB
EBV INTERPRETATION: ABNORMAL
EBV NA IGG SER-ACNC: 364 U/ML (ref 0–17.9)
EBV VCA IGG SER-ACNC: 156 U/ML (ref 0–17.9)
EBV VCA IGM SER-ACNC: >160 U/ML (ref 0–35.9)

## 2024-10-24 ENCOUNTER — OFFICE VISIT (OUTPATIENT)
Age: 49
End: 2024-10-24
Payer: COMMERCIAL

## 2024-10-24 VITALS
HEIGHT: 67 IN | TEMPERATURE: 98.2 F | DIASTOLIC BLOOD PRESSURE: 79 MMHG | BODY MASS INDEX: 23.17 KG/M2 | HEART RATE: 77 BPM | SYSTOLIC BLOOD PRESSURE: 118 MMHG | WEIGHT: 147.6 LBS | OXYGEN SATURATION: 99 % | RESPIRATION RATE: 16 BRPM

## 2024-10-24 DIAGNOSIS — R79.89 ELEVATED FERRITIN: ICD-10-CM

## 2024-10-24 DIAGNOSIS — G93.31 POSTVIRAL FATIGUE SYNDROME: Primary | ICD-10-CM

## 2024-10-24 PROCEDURE — 99213 OFFICE O/P EST LOW 20 MIN: CPT | Performed by: STUDENT IN AN ORGANIZED HEALTH CARE EDUCATION/TRAINING PROGRAM

## 2024-10-24 RX ORDER — ERGOCALCIFEROL 1.25 MG/1
50000 CAPSULE, LIQUID FILLED ORAL WEEKLY
COMMUNITY

## 2024-10-24 RX ORDER — ESTRADIOL 0.04 MG/D
1 PATCH, EXTENDED RELEASE TRANSDERMAL
COMMUNITY

## 2024-10-24 ASSESSMENT — PATIENT HEALTH QUESTIONNAIRE - PHQ9
SUM OF ALL RESPONSES TO PHQ QUESTIONS 1-9: 0
1. LITTLE INTEREST OR PLEASURE IN DOING THINGS: NOT AT ALL
SUM OF ALL RESPONSES TO PHQ9 QUESTIONS 1 & 2: 0
2. FEELING DOWN, DEPRESSED OR HOPELESS: NOT AT ALL

## 2024-10-24 NOTE — PROGRESS NOTES
Monika Hazel is a 49 y.o. female who was seen in clinic today (10/24/2024) for an acute visit.     Assessment & Plan:   Below is the assessment and plan developed based on review of pertinent history, physical exam, labs, studies, and medications.    1. Postviral fatigue syndrome  Still fatigued post-mono in July. Nikki-menopause and depressive symptoms likely contributing.   Recommended supportive care - rest, nutrition, movement.   She has just started estrogen supplementation with GYN, not sure if this is helping  She will discuss her low mood and trial of wellbutrin with her mental health provider. Will see her tomorrow    2. Elevated ferritin  Acute phase reactant vs true elevation   Transferrin sat is not elevated - would be more suggestive of hemochromatosis  Will repeat labs at annual to determine need for genetic testing for hemochromotosis/heme referral    RTC: 3/3/25 CPE or sooner PRN     Subjective:   Monika was seen today for Fatigue (Ongoing issues she has discuss with provider before )    Had mononucleosis in July    Still fatigued, trouble sleeping    Started estrogen patch and progesterone with GYN to see if this helps.     Had labs done with LINNEA Grace 10/17/24  Normal CBC, CMP, TSH, B12, folate, iron panel. Ferritin 357. T sat 40  She was started on weekly Vit D once labs resulted - level in the 20s    Therapist suggested she discuss low mood with her psych provider. She has a history of seasonal depression but still finding enjoyment in spending time with her boys.     Patient Active Problem List   Diagnosis    Allergic rhinitis    Predisposition to allergic reaction    Sinus headache    Attention deficit hyperactivity disorder (ADHD)    Irritable bowel syndrome with constipation    Elevated ferritin       Prior to Admission medications    Medication Sig Start Date End Date Taking? Authorizing Provider   estradiol (VIVELLE) 0.0375 MG/24HR Place 1 patch onto the skin Twice a

## 2025-03-17 ENCOUNTER — OFFICE VISIT (OUTPATIENT)
Age: 50
End: 2025-03-17
Payer: COMMERCIAL

## 2025-03-17 VITALS
OXYGEN SATURATION: 100 % | DIASTOLIC BLOOD PRESSURE: 72 MMHG | TEMPERATURE: 97.9 F | HEIGHT: 67 IN | BODY MASS INDEX: 23.45 KG/M2 | HEART RATE: 67 BPM | RESPIRATION RATE: 16 BRPM | SYSTOLIC BLOOD PRESSURE: 111 MMHG | WEIGHT: 149.4 LBS

## 2025-03-17 DIAGNOSIS — R93.89 ABNORMAL IMAGING OF THYROID: ICD-10-CM

## 2025-03-17 DIAGNOSIS — F90.9 ATTENTION DEFICIT HYPERACTIVITY DISORDER (ADHD), UNSPECIFIED ADHD TYPE: ICD-10-CM

## 2025-03-17 DIAGNOSIS — F32.89 OTHER DEPRESSION: ICD-10-CM

## 2025-03-17 DIAGNOSIS — G93.31 POSTVIRAL FATIGUE SYNDROME: ICD-10-CM

## 2025-03-17 DIAGNOSIS — Z00.00 ROUTINE GENERAL MEDICAL EXAMINATION AT A HEALTH CARE FACILITY: Primary | ICD-10-CM

## 2025-03-17 DIAGNOSIS — R79.89 ELEVATED FERRITIN: ICD-10-CM

## 2025-03-17 DIAGNOSIS — R06.09 DOE (DYSPNEA ON EXERTION): ICD-10-CM

## 2025-03-17 PROCEDURE — 99396 PREV VISIT EST AGE 40-64: CPT | Performed by: STUDENT IN AN ORGANIZED HEALTH CARE EDUCATION/TRAINING PROGRAM

## 2025-03-17 PROCEDURE — 99213 OFFICE O/P EST LOW 20 MIN: CPT | Performed by: STUDENT IN AN ORGANIZED HEALTH CARE EDUCATION/TRAINING PROGRAM

## 2025-03-17 RX ORDER — DULOXETIN HYDROCHLORIDE 30 MG/1
30 CAPSULE, DELAYED RELEASE ORAL DAILY
Qty: 30 CAPSULE | Refills: 3 | Status: SHIPPED | OUTPATIENT
Start: 2025-03-17

## 2025-03-17 SDOH — ECONOMIC STABILITY: FOOD INSECURITY: WITHIN THE PAST 12 MONTHS, YOU WORRIED THAT YOUR FOOD WOULD RUN OUT BEFORE YOU GOT MONEY TO BUY MORE.: NEVER TRUE

## 2025-03-17 SDOH — ECONOMIC STABILITY: FOOD INSECURITY: WITHIN THE PAST 12 MONTHS, THE FOOD YOU BOUGHT JUST DIDN'T LAST AND YOU DIDN'T HAVE MONEY TO GET MORE.: NEVER TRUE

## 2025-03-17 ASSESSMENT — PATIENT HEALTH QUESTIONNAIRE - PHQ9
4. FEELING TIRED OR HAVING LITTLE ENERGY: NEARLY EVERY DAY
SUM OF ALL RESPONSES TO PHQ QUESTIONS 1-9: 9
SUM OF ALL RESPONSES TO PHQ QUESTIONS 1-9: 9
8. MOVING OR SPEAKING SO SLOWLY THAT OTHER PEOPLE COULD HAVE NOTICED. OR THE OPPOSITE, BEING SO FIGETY OR RESTLESS THAT YOU HAVE BEEN MOVING AROUND A LOT MORE THAN USUAL: NOT AT ALL
1. LITTLE INTEREST OR PLEASURE IN DOING THINGS: NOT AT ALL
9. THOUGHTS THAT YOU WOULD BE BETTER OFF DEAD, OR OF HURTING YOURSELF: NOT AT ALL
5. POOR APPETITE OR OVEREATING: NOT AT ALL
3. TROUBLE FALLING OR STAYING ASLEEP: NOT AT ALL
7. TROUBLE CONCENTRATING ON THINGS, SUCH AS READING THE NEWSPAPER OR WATCHING TELEVISION: SEVERAL DAYS
6. FEELING BAD ABOUT YOURSELF - OR THAT YOU ARE A FAILURE OR HAVE LET YOURSELF OR YOUR FAMILY DOWN: MORE THAN HALF THE DAYS
SUM OF ALL RESPONSES TO PHQ QUESTIONS 1-9: 9
2. FEELING DOWN, DEPRESSED OR HOPELESS: NEARLY EVERY DAY
SUM OF ALL RESPONSES TO PHQ QUESTIONS 1-9: 9

## 2025-03-17 NOTE — ASSESSMENT & PLAN NOTE
Followed by [facility] NP Jazmín. Has tried a couple different medication, most recently Jornay. None seem to be helping quite as well as she has hoped

## 2025-03-17 NOTE — ASSESSMENT & PLAN NOTE
Will trend lab. Originally checked with GYN in Oct, not sure if this was elevated as acute phase reactant vs other etiology such as hemochromatosis.

## 2025-03-18 LAB
ALBUMIN SERPL-MCNC: 4.1 G/DL (ref 3.5–5)
ALBUMIN/GLOB SERPL: 1.6 (ref 1.1–2.2)
ALP SERPL-CCNC: 55 U/L (ref 45–117)
ALT SERPL-CCNC: 20 U/L (ref 12–78)
ANION GAP SERPL CALC-SCNC: 4 MMOL/L (ref 2–12)
AST SERPL-CCNC: 10 U/L (ref 15–37)
BASOPHILS # BLD: 0.03 K/UL (ref 0–0.1)
BASOPHILS NFR BLD: 0.6 % (ref 0–1)
BILIRUB SERPL-MCNC: 0.4 MG/DL (ref 0.2–1)
BUN SERPL-MCNC: 20 MG/DL (ref 6–20)
BUN/CREAT SERPL: 27 (ref 12–20)
CALCIUM SERPL-MCNC: 9.1 MG/DL (ref 8.5–10.1)
CHLORIDE SERPL-SCNC: 109 MMOL/L (ref 97–108)
CHOLEST SERPL-MCNC: 198 MG/DL
CO2 SERPL-SCNC: 27 MMOL/L (ref 21–32)
CREAT SERPL-MCNC: 0.73 MG/DL (ref 0.55–1.02)
DIFFERENTIAL METHOD BLD: NORMAL
EOSINOPHIL # BLD: 0.07 K/UL (ref 0–0.4)
EOSINOPHIL NFR BLD: 1.4 % (ref 0–7)
ERYTHROCYTE [DISTWIDTH] IN BLOOD BY AUTOMATED COUNT: 11.6 % (ref 11.5–14.5)
FERRITIN SERPL-MCNC: 174 NG/ML (ref 26–388)
GLOBULIN SER CALC-MCNC: 2.6 G/DL (ref 2–4)
GLUCOSE SERPL-MCNC: 93 MG/DL (ref 65–100)
HCT VFR BLD AUTO: 39.7 % (ref 35–47)
HDLC SERPL-MCNC: 63 MG/DL
HDLC SERPL: 3.1 (ref 0–5)
HGB BLD-MCNC: 13.1 G/DL (ref 11.5–16)
IMM GRANULOCYTES # BLD AUTO: 0.01 K/UL (ref 0–0.04)
IMM GRANULOCYTES NFR BLD AUTO: 0.2 % (ref 0–0.5)
IRON SATN MFR SERPL: 33 % (ref 20–50)
IRON SERPL-MCNC: 91 UG/DL (ref 35–150)
LDLC SERPL CALC-MCNC: 119.8 MG/DL (ref 0–100)
LYMPHOCYTES # BLD: 1.92 K/UL (ref 0.8–3.5)
LYMPHOCYTES NFR BLD: 37.5 % (ref 12–49)
MCH RBC QN AUTO: 31.6 PG (ref 26–34)
MCHC RBC AUTO-ENTMCNC: 33 G/DL (ref 30–36.5)
MCV RBC AUTO: 95.7 FL (ref 80–99)
MONOCYTES # BLD: 0.34 K/UL (ref 0–1)
MONOCYTES NFR BLD: 6.6 % (ref 5–13)
NEUTS SEG # BLD: 2.75 K/UL (ref 1.8–8)
NEUTS SEG NFR BLD: 53.7 % (ref 32–75)
NRBC # BLD: 0 K/UL (ref 0–0.01)
NRBC BLD-RTO: 0 PER 100 WBC
PLATELET # BLD AUTO: 211 K/UL (ref 150–400)
PMV BLD AUTO: 11.2 FL (ref 8.9–12.9)
POTASSIUM SERPL-SCNC: 4.1 MMOL/L (ref 3.5–5.1)
PROT SERPL-MCNC: 6.7 G/DL (ref 6.4–8.2)
RBC # BLD AUTO: 4.15 M/UL (ref 3.8–5.2)
SODIUM SERPL-SCNC: 140 MMOL/L (ref 136–145)
TIBC SERPL-MCNC: 275 UG/DL (ref 250–450)
TRIGL SERPL-MCNC: 76 MG/DL
VLDLC SERPL CALC-MCNC: 15.2 MG/DL
WBC # BLD AUTO: 5.1 K/UL (ref 3.6–11)

## 2025-03-19 ENCOUNTER — RESULTS FOLLOW-UP (OUTPATIENT)
Age: 50
End: 2025-03-19

## 2025-03-19 NOTE — RESULT ENCOUNTER NOTE
Will send MCM  - normal CBC and CMP  - mild HLD The 10-year ASCVD risk score (Charlie COWART, et al., 2019) is: 0.7%   - normal iron levels and ferritin

## 2025-03-21 ENCOUNTER — HOSPITAL ENCOUNTER (OUTPATIENT)
Facility: HOSPITAL | Age: 50
Discharge: HOME | End: 2025-03-24
Payer: COMMERCIAL

## 2025-03-21 DIAGNOSIS — R93.89 ABNORMAL IMAGING OF THYROID: ICD-10-CM

## 2025-03-21 PROCEDURE — 76536 US EXAM OF HEAD AND NECK: CPT

## 2025-03-28 ENCOUNTER — RESULTS FOLLOW-UP (OUTPATIENT)
Age: 50
End: 2025-03-28

## 2025-03-28 ENCOUNTER — HOSPITAL ENCOUNTER (OUTPATIENT)
Facility: HOSPITAL | Age: 50
Discharge: HOME | End: 2025-03-30
Attending: STUDENT IN AN ORGANIZED HEALTH CARE EDUCATION/TRAINING PROGRAM
Payer: COMMERCIAL

## 2025-03-28 VITALS
DIASTOLIC BLOOD PRESSURE: 72 MMHG | SYSTOLIC BLOOD PRESSURE: 111 MMHG | HEIGHT: 67 IN | WEIGHT: 149 LBS | BODY MASS INDEX: 23.39 KG/M2

## 2025-03-28 DIAGNOSIS — R06.09 DOE (DYSPNEA ON EXERTION): ICD-10-CM

## 2025-03-28 DIAGNOSIS — G93.31 POSTVIRAL FATIGUE SYNDROME: ICD-10-CM

## 2025-03-28 LAB
ECHO AO ARCH DIAM: 2.7 CM
ECHO AO ASC DIAM: 2.7 CM
ECHO AO ASCENDING AORTA INDEX: 1.52 CM/M2
ECHO AV AREA PEAK VELOCITY: 2.2 CM2
ECHO AV AREA VTI: 2.2 CM2
ECHO AV AREA/BSA PEAK VELOCITY: 1.2 CM2/M2
ECHO AV AREA/BSA VTI: 1.2 CM2/M2
ECHO AV MEAN GRADIENT: 5 MMHG
ECHO AV MEAN VELOCITY: 1 M/S
ECHO AV PEAK GRADIENT: 8 MMHG
ECHO AV PEAK VELOCITY: 1.4 M/S
ECHO AV VELOCITY RATIO: 0.71
ECHO AV VTI: 30.7 CM
ECHO BSA: 1.79 M2
ECHO LA DIAMETER INDEX: 1.46 CM/M2
ECHO LA DIAMETER: 2.6 CM
ECHO LA VOL A-L A2C: 28 ML (ref 22–52)
ECHO LA VOL A-L A4C: 25 ML (ref 22–52)
ECHO LA VOL BP: 26 ML (ref 22–52)
ECHO LA VOL MOD A2C: 27 ML (ref 22–52)
ECHO LA VOL MOD A4C: 24 ML (ref 22–52)
ECHO LA VOL/BSA BIPLANE: 15 ML/M2 (ref 16–34)
ECHO LA VOLUME AREA LENGTH: 27 ML
ECHO LA VOLUME INDEX A-L A2C: 16 ML/M2 (ref 16–34)
ECHO LA VOLUME INDEX A-L A4C: 14 ML/M2 (ref 16–34)
ECHO LA VOLUME INDEX AREA LENGTH: 15 ML/M2 (ref 16–34)
ECHO LA VOLUME INDEX MOD A2C: 15 ML/M2 (ref 16–34)
ECHO LA VOLUME INDEX MOD A4C: 13 ML/M2 (ref 16–34)
ECHO LV E' LATERAL VELOCITY: 12.67 CM/S
ECHO LV E' SEPTAL VELOCITY: 10.35 CM/S
ECHO LV EDV A2C: 64 ML
ECHO LV EDV A4C: 76 ML
ECHO LV EDV BP: 70 ML (ref 56–104)
ECHO LV EDV INDEX A4C: 43 ML/M2
ECHO LV EDV INDEX BP: 39 ML/M2
ECHO LV EDV NDEX A2C: 36 ML/M2
ECHO LV EF PHYSICIAN: 63 %
ECHO LV EJECTION FRACTION A2C: 61 %
ECHO LV EJECTION FRACTION A4C: 67 %
ECHO LV EJECTION FRACTION BIPLANE: 63 % (ref 55–100)
ECHO LV ESV A2C: 25 ML
ECHO LV ESV A4C: 25 ML
ECHO LV ESV BP: 26 ML (ref 19–49)
ECHO LV ESV INDEX A2C: 14 ML/M2
ECHO LV ESV INDEX A4C: 14 ML/M2
ECHO LV ESV INDEX BP: 15 ML/M2
ECHO LV FRACTIONAL SHORTENING: 37 % (ref 28–44)
ECHO LV INTERNAL DIMENSION DIASTOLE INDEX: 2.3 CM/M2
ECHO LV INTERNAL DIMENSION DIASTOLIC: 4.1 CM (ref 3.9–5.3)
ECHO LV INTERNAL DIMENSION SYSTOLIC INDEX: 1.46 CM/M2
ECHO LV INTERNAL DIMENSION SYSTOLIC: 2.6 CM
ECHO LV IVSD: 0.5 CM (ref 0.6–0.9)
ECHO LV MASS 2D: 60.2 G (ref 67–162)
ECHO LV MASS INDEX 2D: 33.8 G/M2 (ref 43–95)
ECHO LV POSTERIOR WALL DIASTOLIC: 0.6 CM (ref 0.6–0.9)
ECHO LV RELATIVE WALL THICKNESS RATIO: 0.29
ECHO LVOT AREA: 3.1 CM2
ECHO LVOT AV VTI INDEX: 0.73
ECHO LVOT DIAM: 2 CM
ECHO LVOT MEAN GRADIENT: 2 MMHG
ECHO LVOT PEAK GRADIENT: 4 MMHG
ECHO LVOT PEAK VELOCITY: 1 M/S
ECHO LVOT STROKE VOLUME INDEX: 39.7 ML/M2
ECHO LVOT SV: 70.7 ML
ECHO LVOT VTI: 22.5 CM
ECHO MV A VELOCITY: 0.56 M/S
ECHO MV E DECELERATION TIME (DT): 252.4 MS
ECHO MV E VELOCITY: 0.71 M/S
ECHO MV E/A RATIO: 1.27
ECHO MV E/E' LATERAL: 5.6
ECHO MV E/E' RATIO (AVERAGED): 6.23
ECHO MV E/E' SEPTAL: 6.86
ECHO PV MAX VELOCITY: 0.8 M/S
ECHO PV PEAK GRADIENT: 3 MMHG
ECHO RV FREE WALL PEAK S': 13.2 CM/S
ECHO RV INTERNAL DIMENSION: 3.7 CM
ECHO RV TAPSE: 2.2 CM (ref 1.7–?)
ECHO RVOT MEAN GRADIENT: 1 MMHG
ECHO RVOT PEAK GRADIENT: 2 MMHG
ECHO RVOT PEAK VELOCITY: 0.7 M/S
ECHO RVOT VTI: 12.5 CM
ECHO TV REGURGITANT MAX VELOCITY: 2.22 M/S
ECHO TV REGURGITANT PEAK GRADIENT: 20 MMHG

## 2025-03-28 PROCEDURE — 93306 TTE W/DOPPLER COMPLETE: CPT | Performed by: STUDENT IN AN ORGANIZED HEALTH CARE EDUCATION/TRAINING PROGRAM

## 2025-03-28 PROCEDURE — 93306 TTE W/DOPPLER COMPLETE: CPT

## 2025-07-07 ENCOUNTER — OFFICE VISIT (OUTPATIENT)
Age: 50
End: 2025-07-07
Payer: COMMERCIAL

## 2025-07-07 VITALS
WEIGHT: 148 LBS | SYSTOLIC BLOOD PRESSURE: 104 MMHG | OXYGEN SATURATION: 98 % | DIASTOLIC BLOOD PRESSURE: 68 MMHG | RESPIRATION RATE: 18 BRPM | HEIGHT: 67 IN | BODY MASS INDEX: 23.23 KG/M2 | TEMPERATURE: 97.6 F | HEART RATE: 68 BPM

## 2025-07-07 DIAGNOSIS — F32.89 OTHER DEPRESSION: ICD-10-CM

## 2025-07-07 DIAGNOSIS — G93.31 POSTVIRAL FATIGUE SYNDROME: Primary | ICD-10-CM

## 2025-07-07 DIAGNOSIS — N95.1 PERIMENOPAUSE: ICD-10-CM

## 2025-07-07 PROCEDURE — 99214 OFFICE O/P EST MOD 30 MIN: CPT | Performed by: STUDENT IN AN ORGANIZED HEALTH CARE EDUCATION/TRAINING PROGRAM

## 2025-07-07 RX ORDER — PROGESTERONE 100 MG/1
100 CAPSULE ORAL DAILY
COMMUNITY

## 2025-07-07 NOTE — PROGRESS NOTES
attendance at the appointment consented to the use of AI, including the recording.        Angie Irene MD